# Patient Record
Sex: MALE | NOT HISPANIC OR LATINO | URBAN - METROPOLITAN AREA
[De-identification: names, ages, dates, MRNs, and addresses within clinical notes are randomized per-mention and may not be internally consistent; named-entity substitution may affect disease eponyms.]

---

## 2019-02-24 ENCOUNTER — EMERGENCY (EMERGENCY)
Facility: HOSPITAL | Age: 64
LOS: 0 days | Discharge: PSYCHIATRIC OR SUBSTANCE USE FACILITY | End: 2019-02-25
Attending: EMERGENCY MEDICINE
Payer: MEDICAID

## 2019-02-24 VITALS
OXYGEN SATURATION: 100 % | WEIGHT: 169.98 LBS | SYSTOLIC BLOOD PRESSURE: 187 MMHG | HEART RATE: 72 BPM | HEIGHT: 72 IN | DIASTOLIC BLOOD PRESSURE: 96 MMHG | TEMPERATURE: 98 F

## 2019-02-24 LAB
ALBUMIN SERPL ELPH-MCNC: 3.3 G/DL — SIGNIFICANT CHANGE UP (ref 3.3–5)
ALP SERPL-CCNC: 67 U/L — SIGNIFICANT CHANGE UP (ref 40–120)
ALT FLD-CCNC: 35 U/L — SIGNIFICANT CHANGE UP (ref 12–78)
ANION GAP SERPL CALC-SCNC: 6 MMOL/L — SIGNIFICANT CHANGE UP (ref 5–17)
AST SERPL-CCNC: 41 U/L — HIGH (ref 15–37)
BASOPHILS # BLD AUTO: 0.03 K/UL — SIGNIFICANT CHANGE UP (ref 0–0.2)
BASOPHILS NFR BLD AUTO: 0.5 % — SIGNIFICANT CHANGE UP (ref 0–2)
BILIRUB SERPL-MCNC: 0.4 MG/DL — SIGNIFICANT CHANGE UP (ref 0.2–1.2)
BUN SERPL-MCNC: 16 MG/DL — SIGNIFICANT CHANGE UP (ref 7–23)
CALCIUM SERPL-MCNC: 8.3 MG/DL — LOW (ref 8.5–10.1)
CHLORIDE SERPL-SCNC: 109 MMOL/L — HIGH (ref 96–108)
CO2 SERPL-SCNC: 27 MMOL/L — SIGNIFICANT CHANGE UP (ref 22–31)
CREAT SERPL-MCNC: 0.7 MG/DL — SIGNIFICANT CHANGE UP (ref 0.5–1.3)
EOSINOPHIL # BLD AUTO: 0.4 K/UL — SIGNIFICANT CHANGE UP (ref 0–0.5)
EOSINOPHIL NFR BLD AUTO: 6.7 % — HIGH (ref 0–6)
ETHANOL SERPL-MCNC: <10 MG/DL — SIGNIFICANT CHANGE UP (ref 0–10)
GLUCOSE SERPL-MCNC: 95 MG/DL — SIGNIFICANT CHANGE UP (ref 70–99)
HCT VFR BLD CALC: 42.4 % — SIGNIFICANT CHANGE UP (ref 39–50)
HGB BLD-MCNC: 13.1 G/DL — SIGNIFICANT CHANGE UP (ref 13–17)
IMM GRANULOCYTES NFR BLD AUTO: 0.2 % — SIGNIFICANT CHANGE UP (ref 0–1.5)
LYMPHOCYTES # BLD AUTO: 1.46 K/UL — SIGNIFICANT CHANGE UP (ref 1–3.3)
LYMPHOCYTES # BLD AUTO: 24.3 % — SIGNIFICANT CHANGE UP (ref 13–44)
MCHC RBC-ENTMCNC: 27.1 PG — SIGNIFICANT CHANGE UP (ref 27–34)
MCHC RBC-ENTMCNC: 30.9 GM/DL — LOW (ref 32–36)
MCV RBC AUTO: 87.8 FL — SIGNIFICANT CHANGE UP (ref 80–100)
MONOCYTES # BLD AUTO: 0.65 K/UL — SIGNIFICANT CHANGE UP (ref 0–0.9)
MONOCYTES NFR BLD AUTO: 10.8 % — SIGNIFICANT CHANGE UP (ref 2–14)
NEUTROPHILS # BLD AUTO: 3.45 K/UL — SIGNIFICANT CHANGE UP (ref 1.8–7.4)
NEUTROPHILS NFR BLD AUTO: 57.5 % — SIGNIFICANT CHANGE UP (ref 43–77)
NRBC # BLD: 0 /100 WBCS — SIGNIFICANT CHANGE UP (ref 0–0)
PLATELET # BLD AUTO: 128 K/UL — LOW (ref 150–400)
POTASSIUM SERPL-MCNC: 3.5 MMOL/L — SIGNIFICANT CHANGE UP (ref 3.5–5.3)
POTASSIUM SERPL-SCNC: 3.5 MMOL/L — SIGNIFICANT CHANGE UP (ref 3.5–5.3)
PROT SERPL-MCNC: 6.9 GM/DL — SIGNIFICANT CHANGE UP (ref 6–8.3)
RBC # BLD: 4.83 M/UL — SIGNIFICANT CHANGE UP (ref 4.2–5.8)
RBC # FLD: 14.6 % — HIGH (ref 10.3–14.5)
SODIUM SERPL-SCNC: 142 MMOL/L — SIGNIFICANT CHANGE UP (ref 135–145)
WBC # BLD: 6 K/UL — SIGNIFICANT CHANGE UP (ref 3.8–10.5)
WBC # FLD AUTO: 6 K/UL — SIGNIFICANT CHANGE UP (ref 3.8–10.5)

## 2019-02-24 PROCEDURE — 99285 EMERGENCY DEPT VISIT HI MDM: CPT

## 2019-02-24 NOTE — ED ADULT TRIAGE NOTE - CHIEF COMPLAINT QUOTE
pt walked to EMS station for help. pt states he is here "because a cult is chasing him" and they come to his house and take his stuff. denies SI/HI at this time

## 2019-02-24 NOTE — ED ADULT NURSE NOTE - OBJECTIVE STATEMENT
63 y/o male pmhx htn dm presents to the ed after going to ems after stating that a cult is after him. patient is aware of the month and year but unable to recall the day, states "there is a lot that has been going on" denies hallucination, delusions

## 2019-02-24 NOTE — ED ADULT NURSE NOTE - HPI (INCLUDE ILLNESS QUALITY, SEVERITY, DURATION, TIMING, CONTEXT, MODIFYING FACTORS, ASSOCIATED SIGNS AND SYMPTOMS)
patient states "I don't feel safe at home. there are some people from a [place] cult and if you don't pass the line then you will be eliminated " I walked about ten miles today, I walked from [place] to [facility]

## 2019-02-24 NOTE — ED ADULT NURSE NOTE - NSIMPLEMENTINTERV_GEN_ALL_ED
Implemented All Universal Safety Interventions:  [place] to call system. Call bell, personal items and telephone within reach. Instruct patient to call for assistance. Room bathroom lighting operational. Non-slip footwear when patient is off stretcher. Physically safe environment: no spills, clutter or unnecessary equipment. Stretcher in lowest position, wheels locked, appropriate side rails in place.

## 2019-02-25 ENCOUNTER — INPATIENT (INPATIENT)
Facility: HOSPITAL | Age: 64
LOS: 45 days | Discharge: HOME | End: 2019-04-12
Attending: PSYCHIATRY & NEUROLOGY | Admitting: PSYCHIATRY & NEUROLOGY
Payer: MEDICAID

## 2019-02-25 VITALS
SYSTOLIC BLOOD PRESSURE: 165 MMHG | OXYGEN SATURATION: 99 % | TEMPERATURE: 98 F | DIASTOLIC BLOOD PRESSURE: 89 MMHG | HEART RATE: 61 BPM | RESPIRATION RATE: 17 BRPM

## 2019-02-25 VITALS — HEIGHT: 72 IN | TEMPERATURE: 98 F | WEIGHT: 162.92 LBS | RESPIRATION RATE: 14 BRPM

## 2019-02-25 DIAGNOSIS — R45.86 EMOTIONAL LABILITY: ICD-10-CM

## 2019-02-25 DIAGNOSIS — F29 UNSPECIFIED PSYCHOSIS NOT DUE TO A SUBSTANCE OR KNOWN PHYSIOLOGICAL CONDITION: ICD-10-CM

## 2019-02-25 DIAGNOSIS — F25.9 SCHIZOAFFECTIVE DISORDER, UNSPECIFIED: ICD-10-CM

## 2019-02-25 LAB
AMPHET UR-MCNC: NEGATIVE — SIGNIFICANT CHANGE UP
APPEARANCE UR: CLEAR — SIGNIFICANT CHANGE UP
BARBITURATES UR SCN-MCNC: NEGATIVE — SIGNIFICANT CHANGE UP
BENZODIAZ UR-MCNC: NEGATIVE — SIGNIFICANT CHANGE UP
BILIRUB UR-MCNC: NEGATIVE — SIGNIFICANT CHANGE UP
COCAINE METAB.OTHER UR-MCNC: NEGATIVE — SIGNIFICANT CHANGE UP
COLOR SPEC: YELLOW — SIGNIFICANT CHANGE UP
DIFF PNL FLD: NEGATIVE — SIGNIFICANT CHANGE UP
GLUCOSE BLDC GLUCOMTR-MCNC: 110 MG/DL — HIGH (ref 70–99)
GLUCOSE BLDC GLUCOMTR-MCNC: 167 MG/DL — HIGH (ref 70–99)
GLUCOSE UR QL: NEGATIVE — SIGNIFICANT CHANGE UP
KETONES UR-MCNC: NEGATIVE — SIGNIFICANT CHANGE UP
LEUKOCYTE ESTERASE UR-ACNC: NEGATIVE — SIGNIFICANT CHANGE UP
METHADONE UR-MCNC: NEGATIVE — SIGNIFICANT CHANGE UP
NITRITE UR-MCNC: NEGATIVE — SIGNIFICANT CHANGE UP
OPIATES UR-MCNC: NEGATIVE — SIGNIFICANT CHANGE UP
PCP SPEC-MCNC: SIGNIFICANT CHANGE UP
PCP UR-MCNC: NEGATIVE — SIGNIFICANT CHANGE UP
PH UR: 6 — SIGNIFICANT CHANGE UP (ref 5–8)
PROT UR-MCNC: NEGATIVE — SIGNIFICANT CHANGE UP
SP GR SPEC: 1.02 — SIGNIFICANT CHANGE UP (ref 1.01–1.02)
THC UR QL: NEGATIVE — SIGNIFICANT CHANGE UP
UROBILINOGEN FLD QL: NEGATIVE — SIGNIFICANT CHANGE UP

## 2019-02-25 PROCEDURE — 99223 1ST HOSP IP/OBS HIGH 75: CPT

## 2019-02-25 PROCEDURE — 70450 CT HEAD/BRAIN W/O DYE: CPT | Mod: 26

## 2019-02-25 RX ORDER — TRAZODONE HCL 50 MG
25 TABLET ORAL AT BEDTIME
Qty: 0 | Refills: 0 | Status: DISCONTINUED | OUTPATIENT
Start: 2019-02-25 | End: 2019-04-12

## 2019-02-25 RX ORDER — SODIUM CHLORIDE 9 MG/ML
1000 INJECTION, SOLUTION INTRAVENOUS
Qty: 0 | Refills: 0 | Status: DISCONTINUED | OUTPATIENT
Start: 2019-02-25 | End: 2019-03-18

## 2019-02-25 RX ORDER — RISPERIDONE 4 MG/1
1 TABLET ORAL
Qty: 0 | Refills: 0 | Status: DISCONTINUED | OUTPATIENT
Start: 2019-02-25 | End: 2019-02-28

## 2019-02-25 RX ORDER — HALOPERIDOL DECANOATE 100 MG/ML
2 INJECTION INTRAMUSCULAR EVERY 6 HOURS
Qty: 0 | Refills: 0 | Status: DISCONTINUED | OUTPATIENT
Start: 2019-02-25 | End: 2019-03-04

## 2019-02-25 RX ORDER — DEXTROSE 50 % IN WATER 50 %
12.5 SYRINGE (ML) INTRAVENOUS ONCE
Qty: 0 | Refills: 0 | Status: DISCONTINUED | OUTPATIENT
Start: 2019-02-25 | End: 2019-04-10

## 2019-02-25 RX ORDER — GLUCAGON INJECTION, SOLUTION 0.5 MG/.1ML
1 INJECTION, SOLUTION SUBCUTANEOUS ONCE
Qty: 0 | Refills: 0 | Status: DISCONTINUED | OUTPATIENT
Start: 2019-02-25 | End: 2019-04-10

## 2019-02-25 RX ORDER — MIDAZOLAM HYDROCHLORIDE 1 MG/ML
1 INJECTION, SOLUTION INTRAMUSCULAR; INTRAVENOUS ONCE
Qty: 0 | Refills: 0 | Status: DISCONTINUED | OUTPATIENT
Start: 2019-02-25 | End: 2019-02-25

## 2019-02-25 RX ORDER — METFORMIN HYDROCHLORIDE 850 MG/1
500 TABLET ORAL
Qty: 0 | Refills: 0 | Status: DISCONTINUED | OUTPATIENT
Start: 2019-02-25 | End: 2019-04-10

## 2019-02-25 RX ORDER — AMLODIPINE BESYLATE 2.5 MG/1
5 TABLET ORAL DAILY
Qty: 0 | Refills: 0 | Status: DISCONTINUED | OUTPATIENT
Start: 2019-02-25 | End: 2019-04-12

## 2019-02-25 RX ORDER — RISPERIDONE 4 MG/1
0.5 TABLET ORAL
Qty: 0 | Refills: 0 | Status: DISCONTINUED | OUTPATIENT
Start: 2019-02-25 | End: 2019-02-25

## 2019-02-25 RX ORDER — INSULIN LISPRO 100/ML
VIAL (ML) SUBCUTANEOUS
Qty: 0 | Refills: 0 | Status: DISCONTINUED | OUTPATIENT
Start: 2019-02-25 | End: 2019-03-18

## 2019-02-25 RX ORDER — HALOPERIDOL DECANOATE 100 MG/ML
2 INJECTION INTRAMUSCULAR ONCE
Qty: 0 | Refills: 0 | Status: DISCONTINUED | OUTPATIENT
Start: 2019-02-25 | End: 2019-03-04

## 2019-02-25 RX ORDER — DEXTROSE 50 % IN WATER 50 %
15 SYRINGE (ML) INTRAVENOUS ONCE
Qty: 0 | Refills: 0 | Status: DISCONTINUED | OUTPATIENT
Start: 2019-02-25 | End: 2019-04-10

## 2019-02-25 RX ORDER — AMLODIPINE BESYLATE 2.5 MG/1
5 TABLET ORAL ONCE
Qty: 0 | Refills: 0 | Status: COMPLETED | OUTPATIENT
Start: 2019-02-25 | End: 2019-02-25

## 2019-02-25 RX ORDER — DEXTROSE 50 % IN WATER 50 %
25 SYRINGE (ML) INTRAVENOUS ONCE
Qty: 0 | Refills: 0 | Status: DISCONTINUED | OUTPATIENT
Start: 2019-02-25 | End: 2019-04-10

## 2019-02-25 RX ADMIN — Medication 25 MILLIGRAM(S): at 20:39

## 2019-02-25 RX ADMIN — AMLODIPINE BESYLATE 5 MILLIGRAM(S): 2.5 TABLET ORAL at 05:36

## 2019-02-25 RX ADMIN — METFORMIN HYDROCHLORIDE 500 MILLIGRAM(S): 850 TABLET ORAL at 20:37

## 2019-02-25 RX ADMIN — RISPERIDONE 1 MILLIGRAM(S): 4 TABLET ORAL at 20:39

## 2019-02-25 RX ADMIN — Medication 1: at 18:08

## 2019-02-25 RX ADMIN — AMLODIPINE BESYLATE 5 MILLIGRAM(S): 2.5 TABLET ORAL at 20:37

## 2019-02-25 RX ADMIN — MIDAZOLAM HYDROCHLORIDE 1 MILLIGRAM(S): 1 INJECTION, SOLUTION INTRAMUSCULAR; INTRAVENOUS at 05:00

## 2019-02-25 NOTE — ED BEHAVIORAL HEALTH ASSESSMENT NOTE - MEDICAL ISSUES AND PLAN (INCLUDE STANDING AND PRN MEDICATION)
per EM provider per EM provider, pt received amlodipine 5mg once for HTN. Would consider internal medicine consult for further management.

## 2019-02-25 NOTE — ED BEHAVIORAL HEALTH ASSESSMENT NOTE - DESCRIPTION
ED Course:  Patient arrived to the ED approximately hours prior to consultation. Per ED RN and documentation patient arrived alert and oriented x3 but unable to recall the day of the month, he has fair grooming and hygiene, he is cooperative with ED medical and safety protocols. Patient reports anxious mood and his affect is congruent, he denies suicidal or homicidal thoughts, denies hallucinations, is paranoid that a cult is following him stating “I don’t feel safe at home, there are some people from a [employer] cult and if you don’t pass the line then you will be eliminated”, reported walking 10 miles earlier from [place] to [place]. Patient did not have visitors to bedside. Patient remained in good behavioral control while in the ED, he did not require PRN psychiatric medication prior to assessment.    Utox and BAL negative  Head CT with no acute findings  Labs WNL    Vital Signs Last 24 Hrs  T(C): 36.9 (25 Feb 2019 04:44), Max: 36.9 (25 Feb 2019 04:44)  T(F): 98.4 (25 Feb 2019 04:44), Max: 98.4 (25 Feb 2019 04:44)  HR: 67 (25 Feb 2019 04:44) (67 - 72)  BP: 182/96 (25 Feb 2019 04:44) (182/96 - 187/96)  BP(mean): --  RR: 18 (25 Feb 2019 04:44) (18 - 18)  SpO2: 98% (25 Feb 2019 04:44) (98% - 100%) HTN and DM see HPI

## 2019-02-25 NOTE — ED BEHAVIORAL HEALTH ASSESSMENT NOTE - PSYCHIATRIC ISSUES AND PLAN (INCLUDE STANDING AND PRN MEDICATION)
Start Risperdal 0.5mg BID. Haldol 1mg/Ativan 1mg PO/IM q6h PRN severe agitation Start Risperdal 0.5mg BID. Haldol 2mg/Ativan 1mg PO/IM q6h PRN severe agitation

## 2019-02-25 NOTE — CONSULT NOTE ADULT - SUBJECTIVE AND OBJECTIVE BOX
PCP:    CHIEF COMPLAINT:   acute psychosis    HISTORY OF THE PRESENT ILLNESS:    PAST MEDICAL HISTORY:  HTN  Diabetes Mellitus    PAST SURGICAL HISTORY:    FAMILY HISTORY:   non-contributory to the patient's current presentation    SOCIAL HISTORY:  no smoking, no alcohol, no drugs    REVIEW OF SYSTEMS:   All 10 systems reviewed in detailed and found to be negative with the exception of what has already been described above    MEDICATIONS  (STANDING):  risperiDONE   Tablet 0.5 milliGRAM(s) Oral two times a day    MEDICATIONS  (PRN):  haloperidol     Tablet 2 milliGRAM(s) Oral every 6 hours PRN severe agitation  haloperidol    Injectable 2 milliGRAM(s) IntraMuscular once PRN severe agitation  LORazepam     Tablet 1 milliGRAM(s) Oral every 6 hours PRN severe agitation  LORazepam   Injectable 1 milliGRAM(s) IntraMuscular once PRN severe agitation    VITALS:  T(F): 98.1 (02-25-19 @ 11:30), Max: 98.1 (02-25-19 @ 11:30)  HR: --  BP: -- 150/70  RR: 14 (02-25-19 @ 11:30) (14 - 14)    POCT Blood Glucose.: 167 mg/dL (25 Feb 2019 17:24)  POCT Blood Glucose.: 110 mg/dL (25 Feb 2019 11:23)      PHYSICAL EXAM:  HEENT:  pupils equal and reactive, EOMI, no oropharyngeal lesions, erythema, exudates, oral thrush  NECK:   supple, no carotid bruits, no palpable lymph nodes, no thyromegaly  CV:  +S1, +S2, regular, no murmurs or rubs  RESP:   lungs clear to auscultation bilaterally, no wheezing, rales, rhonchi, good air entry bilaterally  BREAST:  not examined  GI:  abdomen soft, non-tender, non-distended, normal BS, no bruits, no abdominal masses, no palpable masses  RECTAL:  not examined  :  not examined  MSK:   normal muscle tone, no atrophy, no rigidity, no contractions  EXT:  no clubbing, no cyanosis, no edema, no calf pain, swelling or erythema  VASCULAR:  pulses equal and symmetric in the upper and lower extremities  NEURO:  AAOX3, no focal neurological deficits, follows all commands, able to move extremities spontaneously  SKIN:  no ulcers, lesions or rashes    LABS:  Labs from [facility] reviewed.    CT HEAD - negative  UA negative  Platelets 128  ETOH - negative  Urine Drug Screen - negative    EKG:  sinus farhad at 57bpm, RBBB, no acute changes, QTc 426    IMPRESSION:    ACUTE PSYCHOSIS    DIABETES MELLITUS    HYPERTENSION      RECOMMENDATIONS:  -  medically stable for admission to   -  primary management per psych team  -          d/w patient and psych RN PCP:   Patient goes to [facility]    CHIEF COMPLAINT:   acute psychosis    HISTORY OF THE PRESENT ILLNESS:  64 M with no prior psych history wandered into an EMS office near [place] looking for the police.  She was telling the EMS staff that there was a [place] cult that was after him and wanted to kill him becuase he wrote a lot of [place] songs and put the name of the father of a cult member in his song.  He claims to have written over 170 songs and now this cult is looking for him  He also claims to be hearing their voices telling him he is going to fall down.  Patient was evaluated at [facility] ED and was noted to be disorganized and suffering from paranoia.  He was not feeling safe to go home.  He denies any suicidal or homicidal thoughts or intentions.  He is unsure of what medications he takes but says that he is on Metformin for DM and 2 blood pressure meds.  He does not know what pharmacy he uses.    PAST MEDICAL HISTORY:  HTN  Diabetes Mellitus, on Metformin    PAST SURGICAL HISTORY:  NONE    FAMILY HISTORY:    Father - DM and Asthma  Mother - Fall and head trauma    SOCIAL HISTORY:  no smoking, no alcohol, no drugs, , 3 children, not working, [place]    REVIEW OF SYSTEMS:   All 10 systems reviewed in detailed and found to be negative with the exception of what has already been described above    MEDICATIONS  (STANDING):  risperiDONE   Tablet 0.5 milliGRAM(s) Oral two times a day    MEDICATIONS  (PRN):  haloperidol     Tablet 2 milliGRAM(s) Oral every 6 hours PRN severe agitation  haloperidol    Injectable 2 milliGRAM(s) IntraMuscular once PRN severe agitation  LORazepam     Tablet 1 milliGRAM(s) Oral every 6 hours PRN severe agitation  LORazepam   Injectable 1 milliGRAM(s) IntraMuscular once PRN severe agitation    VITALS:  T(F): 98.1 (02-25-19 @ 11:30), Max: 98.1 (02-25-19 @ 11:30)  HR: --  BP: -- 150/70  RR: 14 (02-25-19 @ 11:30) (14 - 14)    POCT Blood Glucose.: 167 mg/dL (25 Feb 2019 17:24)  POCT Blood Glucose.: 110 mg/dL (25 Feb 2019 11:23)      PHYSICAL EXAM:  HEENT:  pupils equal and reactive, EOMI, no oropharyngeal lesions, erythema, exudates, oral thrush  NECK:   supple, no carotid bruits, no palpable lymph nodes, no thyromegaly  CV:  +S1, +S2, regular, no murmurs or rubs  RESP:   lungs clear to auscultation bilaterally, no wheezing, rales, rhonchi, good air entry bilaterally  BREAST:  not examined  GI:  abdomen soft, non-tender, non-distended, normal BS, no bruits, no abdominal masses, no palpable masses  RECTAL:  not examined  :  not examined  MSK:   normal muscle tone, no atrophy, no rigidity, no contractions  EXT:  no clubbing, no cyanosis, no edema, no calf pain, swelling or erythema  VASCULAR:  pulses equal and symmetric in the upper and lower extremities  NEURO:  AAOX3, no focal neurological deficits, follows all commands, able to move extremities spontaneously  SKIN:  no ulcers, lesions or rashes    LABS:  Labs from [facility] reviewed.    CT HEAD - negative  UA negative  Platelets 128  ETOH - negative  Urine Drug Screen - negative    EKG:  sinus farhad at 57bpm, RBBB, no acute changes, QTc 426    IMPRESSION:    ACUTE PSYCHOSIS    DIABETES MELLITUS, ON METFORMIN    HYPERTENSION      RECOMMENDATIONS:  -  medically stable for admission to   -  primary management per psych team  -  continue Metformin  -  ADA, low salt diet, Humalog SSI   -  check HgA1c  -  for now, start Norvasc (? was on it at [facility])  -  please get in touch with his family and find out what meds and doses he is taking and order the meds for him\  -  have patient follow up with his physicians after discharge  -  will sign off    d/w patient and psych RN

## 2019-02-25 NOTE — ED ADULT NURSE REASSESSMENT NOTE - NS ED NURSE REASSESS COMMENT FT1
pt received from [facility] , pt asleep, arousable , talking , pt with ideation of cult coming to harm him, pt is not an elopement risk afraid to leave , pt not agitated . Gave report for transfer to [facility], at transfer team , wait approx 90- minutes until . pt Undressed in hospital gown, breakfast given , remains on enhanced observation

## 2019-02-25 NOTE — PROGRESS NOTE BEHAVIORAL HEALTH - SUMMARY
64 yr old undomiciled  male , disheveled ,preoccupied , responding to internal stimuli most likely auditory hallucination as the pt is mumbling to self. Pt with impaired ability to care for self Pt denies any suicidals ideation or plan. Pt is hospitalized in attempt to stabilize mood and affect to improve level of daily functioning.

## 2019-02-25 NOTE — ED BEHAVIORAL HEALTH ASSESSMENT NOTE - RISK ASSESSMENT
Risk factors include psychosis, not caring for self, not in psych treatment, and unable to engage in safety planning. At this time pt is at high imminent risk of harm and requires inpt hospitalization for safety and stabilization.

## 2019-02-25 NOTE — PROGRESS NOTE BEHAVIORAL HEALTH - NSBHFUPINTERVALHXFT_PSY_A_CORE
Pt is a transfer from [facility] with acute psychosis . Pt with auditory hallucination, paranoid preoccupation Pt is a poor historian.  Pt responding to internal stimuli.  He denies any suicidal ideation or plan .

## 2019-02-25 NOTE — ED BEHAVIORAL HEALTH ASSESSMENT NOTE - HPI (INCLUDE ILLNESS QUALITY, SEVERITY, DURATION, TIMING, CONTEXT, MODIFYING FACTORS, ASSOCIATED SIGNS AND SYMPTOMS)
Patient is a 63 y/o AA M, , has 3 adult children, unemployed, domiciled alone for one week (previously living with wife), with unknown PPhx, denies hx of inpt hospitalizations, denies suicide attempts, denies violence hx, denies legal problems, denies substance use, and PMhx of HTN and DM. Patient presents today brought in by EMS after he was found on the streets reporting that a cult is after him.    Per EMS report: "Pt thought that was the police station so that he can let PD know that he is being chased by a [place] cult that took his toothbrush and toothpaste and that he needs them to brush his teeth. Patient also states that they are singing [employer] and [employer]pt[employer] to him and fears that they are coming back to take more stuff from him. He states that they took all his ID and important papers. AAOx3".    On evaluation pt is calm and cooperative. He states that he is here in the ED "for my high blood pressure and diabetes". He states that he called police himself. States that "things are not good at home", reports that he may have to give up his house due to "people haunting and targeting me". States that the  of [facility] is after him, "they want me to team up with them to do crooked stuff". Patient repeatedly asks if writer can hear helicopters overhead, states "they're here for me". States that they have been stealing his money and phone, states he can hear them "on the radios and through the air conditioners". States he sent his wife "away" (to her daughter's home) due to concern for her safety. Denies thought insertion/broadcasting/withdrawal. Denies VH. Denies CAH. Denies depressed mood, pervasive anxiety symptoms, changes in sleep (6-7 hrs per night), appetite, or energy level. Denies SI/HI/I/P, urges for SIB, or aggressive I/I/P. Denies substance use. Patient is unable to provide any contact information for collateral sources, states he does not know any phone numbers by memory and that his phone has been stolen.

## 2019-02-25 NOTE — ED BEHAVIORAL HEALTH ASSESSMENT NOTE - AXIS IV
Problem related to social environment/Problems with primary support/Housing problems/Occupational problems/Problems with access to healthcare services

## 2019-02-25 NOTE — PROGRESS NOTE BEHAVIORAL HEALTH - NSBHCHARTREVIEWVS_PSY_A_CORE FT
Vital Signs Last 24 Hrs  T(C): 36.7 (25 Feb 2019 11:30), Max: 36.7 (25 Feb 2019 11:30)  T(F): 98.1 (25 Feb 2019 11:30), Max: 98.1 (25 Feb 2019 11:30)  HR: --  BP: --  BP(mean): --  RR: 14 (25 Feb 2019 11:30) (14 - 14)  SpO2: --

## 2019-02-25 NOTE — ED PROVIDER NOTE - OBJECTIVE STATEMENT
Pertinent PMH/PSH/FHx/SHx and Review of Systems contained within:  63 yo m with unknown pmh bibems for paranoid delusions and disorganized throughts. unable to get more history from patient.

## 2019-02-25 NOTE — PROGRESS NOTE BEHAVIORAL HEALTH - NSBHFUPADDHPIFT_PSY_A_CORE
1. disheveled, paranoid delusions; obsessive thinking, illogical; preoccupied, auditory hallucinations.  He denies any suicidal ideation or intent or plan

## 2019-02-25 NOTE — ED BEHAVIORAL HEALTH ASSESSMENT NOTE - SUMMARY
Patient is a 65 y/o AA M, , has 3 adult children, unemployed, domiciled alone for one week (previously living with wife), with unknown PPhx, denies hx of inpt hospitalizations, denies suicide attempts, denies violence hx, denies legal problems, denies substance use, and PMhx of HTN and DM. Patient presents today brought in by EMS after he was found on the streets reporting that a cult is after him. On evaluation pt reports complex paranoid delusions and associated AH that he is being targeted by a cult/gang and the  of [facility], and that he is unsafe at home and has to give up his house. He is unable to engage in safety planning or care for himself due to his psychiatric symptoms. At this time pt requires inpt hospitalization for safety and stabilization.

## 2019-02-26 DIAGNOSIS — E11.9 TYPE 2 DIABETES MELLITUS WITHOUT COMPLICATIONS: ICD-10-CM

## 2019-02-26 DIAGNOSIS — I10 ESSENTIAL (PRIMARY) HYPERTENSION: ICD-10-CM

## 2019-02-26 DIAGNOSIS — F29 UNSPECIFIED PSYCHOSIS NOT DUE TO A SUBSTANCE OR KNOWN PHYSIOLOGICAL CONDITION: ICD-10-CM

## 2019-02-26 DIAGNOSIS — F22 DELUSIONAL DISORDERS: ICD-10-CM

## 2019-02-26 LAB
GLUCOSE BLDC GLUCOMTR-MCNC: 124 MG/DL — HIGH (ref 70–99)
GLUCOSE BLDC GLUCOMTR-MCNC: 146 MG/DL — HIGH (ref 70–99)
GLUCOSE BLDC GLUCOMTR-MCNC: 179 MG/DL — HIGH (ref 70–99)
HBA1C BLD-MCNC: 7.6 % — HIGH (ref 4–5.6)

## 2019-02-26 PROCEDURE — 99231 SBSQ HOSP IP/OBS SF/LOW 25: CPT

## 2019-02-26 RX ADMIN — Medication 25 MILLIGRAM(S): at 21:39

## 2019-02-26 RX ADMIN — RISPERIDONE 1 MILLIGRAM(S): 4 TABLET ORAL at 09:07

## 2019-02-26 RX ADMIN — METFORMIN HYDROCHLORIDE 500 MILLIGRAM(S): 850 TABLET ORAL at 09:07

## 2019-02-26 RX ADMIN — METFORMIN HYDROCHLORIDE 500 MILLIGRAM(S): 850 TABLET ORAL at 21:39

## 2019-02-26 RX ADMIN — RISPERIDONE 1 MILLIGRAM(S): 4 TABLET ORAL at 21:39

## 2019-02-26 RX ADMIN — Medication 1: at 12:13

## 2019-02-26 RX ADMIN — AMLODIPINE BESYLATE 5 MILLIGRAM(S): 2.5 TABLET ORAL at 09:07

## 2019-02-26 NOTE — PROGRESS NOTE BEHAVIORAL HEALTH - NSBHCHARTREVIEWVS_PSY_A_CORE FT
Vital Signs Last 24 Hrs  T(C): 37 (26 Feb 2019 08:46), Max: 37 (26 Feb 2019 08:46)  T(F): 98.6 (26 Feb 2019 08:46), Max: 98.6 (26 Feb 2019 08:46)  HR: 62 (25 Feb 2019 20:26) (62 - 62)  BP: 172/80 (25 Feb 2019 20:26) (172/80 - 172/80)  BP(mean): --  RR: 14 (26 Feb 2019 08:46) (14 - 16)  SpO2: 100% (26 Feb 2019 08:46) (100% - 100%)

## 2019-02-27 PROBLEM — I10 ESSENTIAL (PRIMARY) HYPERTENSION: Chronic | Status: ACTIVE | Noted: 2019-02-25

## 2019-02-27 PROBLEM — E11.9 TYPE 2 DIABETES MELLITUS WITHOUT COMPLICATIONS: Chronic | Status: ACTIVE | Noted: 2019-02-25

## 2019-02-27 LAB
GLUCOSE BLDC GLUCOMTR-MCNC: 129 MG/DL — HIGH (ref 70–99)
GLUCOSE BLDC GLUCOMTR-MCNC: 143 MG/DL — HIGH (ref 70–99)
GLUCOSE BLDC GLUCOMTR-MCNC: 149 MG/DL — HIGH (ref 70–99)
GLUCOSE BLDC GLUCOMTR-MCNC: 244 MG/DL — HIGH (ref 70–99)

## 2019-02-27 PROCEDURE — 99231 SBSQ HOSP IP/OBS SF/LOW 25: CPT

## 2019-02-27 RX ADMIN — RISPERIDONE 1 MILLIGRAM(S): 4 TABLET ORAL at 09:48

## 2019-02-27 RX ADMIN — AMLODIPINE BESYLATE 5 MILLIGRAM(S): 2.5 TABLET ORAL at 09:48

## 2019-02-27 RX ADMIN — METFORMIN HYDROCHLORIDE 500 MILLIGRAM(S): 850 TABLET ORAL at 21:21

## 2019-02-27 RX ADMIN — RISPERIDONE 1 MILLIGRAM(S): 4 TABLET ORAL at 21:23

## 2019-02-27 RX ADMIN — Medication 2: at 17:26

## 2019-02-27 RX ADMIN — METFORMIN HYDROCHLORIDE 500 MILLIGRAM(S): 850 TABLET ORAL at 09:48

## 2019-02-28 DIAGNOSIS — I10 ESSENTIAL (PRIMARY) HYPERTENSION: ICD-10-CM

## 2019-02-28 DIAGNOSIS — E11.9 TYPE 2 DIABETES MELLITUS WITHOUT COMPLICATIONS: ICD-10-CM

## 2019-02-28 LAB
GLUCOSE BLDC GLUCOMTR-MCNC: 128 MG/DL — HIGH (ref 70–99)
GLUCOSE BLDC GLUCOMTR-MCNC: 152 MG/DL — HIGH (ref 70–99)
GLUCOSE BLDC GLUCOMTR-MCNC: 176 MG/DL — HIGH (ref 70–99)

## 2019-02-28 PROCEDURE — 99231 SBSQ HOSP IP/OBS SF/LOW 25: CPT

## 2019-02-28 RX ORDER — RISPERIDONE 4 MG/1
2 TABLET ORAL
Qty: 0 | Refills: 0 | Status: DISCONTINUED | OUTPATIENT
Start: 2019-02-28 | End: 2019-03-03

## 2019-02-28 RX ADMIN — Medication 1: at 12:35

## 2019-02-28 RX ADMIN — RISPERIDONE 2 MILLIGRAM(S): 4 TABLET ORAL at 21:39

## 2019-02-28 RX ADMIN — Medication 1: at 17:45

## 2019-02-28 RX ADMIN — METFORMIN HYDROCHLORIDE 500 MILLIGRAM(S): 850 TABLET ORAL at 21:39

## 2019-02-28 RX ADMIN — AMLODIPINE BESYLATE 5 MILLIGRAM(S): 2.5 TABLET ORAL at 09:48

## 2019-02-28 RX ADMIN — RISPERIDONE 1 MILLIGRAM(S): 4 TABLET ORAL at 09:48

## 2019-02-28 RX ADMIN — Medication 25 MILLIGRAM(S): at 21:42

## 2019-02-28 RX ADMIN — METFORMIN HYDROCHLORIDE 500 MILLIGRAM(S): 850 TABLET ORAL at 09:48

## 2019-02-28 NOTE — PROGRESS NOTE BEHAVIORAL HEALTH - NSBHCHARTREVIEWVS_PSY_A_CORE FT
Vital Signs Last 24 Hrs  T(C): 36.8 (28 Feb 2019 09:00), Max: 36.8 (28 Feb 2019 09:00)  T(F): 98.3 (28 Feb 2019 09:00), Max: 98.3 (28 Feb 2019 09:00)  HR: --  BP: --  BP(mean): --  RR: 14 (28 Feb 2019 09:00) (14 - 14)  SpO2: 100% (28 Feb 2019 09:00) (100% - 100%)

## 2019-02-28 NOTE — PROGRESS NOTE BEHAVIORAL HEALTH - NSBHFUPINTERVALHXFT_PSY_A_CORE
Pt with continued paranoid thoughts that people are still pursuing him on the outside , but since he is in the hospital that he feels that he is protected from them.  Pt with need for the medication to be increased to 2mg po bid.  Pt denies any suicidal ideation or plan.

## 2019-02-28 NOTE — PROGRESS NOTE BEHAVIORAL HEALTH - SUMMARY
Patient is a 63 y/o AA M, , has 3 adult children, unemployed, domiciled alone for one week (previously living with wife), with unknown PPhx, denies hx of inpt hospitalizations, denies suicide attempts, denies violence hx, denies legal problems, denies substance use, and PMhx of HTN and DM. Patient presents today brought in by EMS after he was found on the streets reporting that a cult is after him. On evaluation pt reports complex paranoid delusions and associated AH that he is being targeted by a cult/gang and the  of [facility], and that he is unsafe at home and has to give up his house. He is unable to engage in safety planning or care for himself due to his psychiatric symptoms. At this time pt requires inpt hospitalization for safety and stabilization.

## 2019-03-01 LAB
GLUCOSE BLDC GLUCOMTR-MCNC: 128 MG/DL — HIGH (ref 70–99)
GLUCOSE BLDC GLUCOMTR-MCNC: 156 MG/DL — HIGH (ref 70–99)
GLUCOSE BLDC GLUCOMTR-MCNC: 97 MG/DL — SIGNIFICANT CHANGE UP (ref 70–99)

## 2019-03-01 PROCEDURE — 99231 SBSQ HOSP IP/OBS SF/LOW 25: CPT

## 2019-03-01 RX ADMIN — Medication 1: at 08:37

## 2019-03-01 RX ADMIN — RISPERIDONE 2 MILLIGRAM(S): 4 TABLET ORAL at 09:17

## 2019-03-01 RX ADMIN — METFORMIN HYDROCHLORIDE 500 MILLIGRAM(S): 850 TABLET ORAL at 21:06

## 2019-03-01 RX ADMIN — AMLODIPINE BESYLATE 5 MILLIGRAM(S): 2.5 TABLET ORAL at 09:17

## 2019-03-01 RX ADMIN — Medication 25 MILLIGRAM(S): at 21:06

## 2019-03-01 RX ADMIN — METFORMIN HYDROCHLORIDE 500 MILLIGRAM(S): 850 TABLET ORAL at 09:16

## 2019-03-01 RX ADMIN — RISPERIDONE 2 MILLIGRAM(S): 4 TABLET ORAL at 21:06

## 2019-03-01 NOTE — PROGRESS NOTE BEHAVIORAL HEALTH - SUMMARY
Patient is a 65 y/o AA M, , has 3 adult children, unemployed, domiciled alone for one week (previously living with wife), with unknown PPhx, denies hx of inpt hospitalizations, denies suicide attempts, denies violence hx, denies legal problems, denies substance use, and PMhx of HTN and DM. Patient presents today brought in by EMS after he was found on the streets reporting that a cult is after him. On evaluation pt reports complex paranoid delusions and associated AH that he is being targeted by a cult/gang and the  of [facility], and that he is unsafe at home and has to give up his house. He is unable to engage in safety planning or care for himself due to his psychiatric symptoms. At this time pt requires inpt hospitalization for safety and stabilization.

## 2019-03-01 NOTE — PROGRESS NOTE BEHAVIORAL HEALTH - NSBHCHARTREVIEWVS_PSY_A_CORE FT
Vital Signs Last 24 Hrs  T(C): 36.9 (01 Mar 2019 08:49), Max: 36.9 (01 Mar 2019 08:49)  T(F): 98.5 (01 Mar 2019 08:49), Max: 98.5 (01 Mar 2019 08:49)  HR: --  BP: --  BP(mean): --  RR: 14 (01 Mar 2019 08:49) (14 - 14)  SpO2: 100% (01 Mar 2019 08:49) (100% - 100%)

## 2019-03-01 NOTE — PROGRESS NOTE BEHAVIORAL HEALTH - NSBHFUPINTERVALHXFT_PSY_A_CORE
Pt with good eye contact. Alert Pt with gradual improvement of mood.  Pt with decreased guardedness , although he is still with thoughts that he would not be safe when he leaves the hospital as he is still being pursued

## 2019-03-02 LAB
GLUCOSE BLDC GLUCOMTR-MCNC: 107 MG/DL — HIGH (ref 70–99)
GLUCOSE BLDC GLUCOMTR-MCNC: 150 MG/DL — HIGH (ref 70–99)
GLUCOSE BLDC GLUCOMTR-MCNC: 178 MG/DL — HIGH (ref 70–99)

## 2019-03-02 RX ADMIN — RISPERIDONE 2 MILLIGRAM(S): 4 TABLET ORAL at 09:26

## 2019-03-02 RX ADMIN — Medication 25 MILLIGRAM(S): at 21:42

## 2019-03-02 RX ADMIN — Medication 1: at 12:11

## 2019-03-02 RX ADMIN — METFORMIN HYDROCHLORIDE 500 MILLIGRAM(S): 850 TABLET ORAL at 09:26

## 2019-03-02 RX ADMIN — AMLODIPINE BESYLATE 5 MILLIGRAM(S): 2.5 TABLET ORAL at 09:26

## 2019-03-02 RX ADMIN — RISPERIDONE 2 MILLIGRAM(S): 4 TABLET ORAL at 21:42

## 2019-03-02 RX ADMIN — METFORMIN HYDROCHLORIDE 500 MILLIGRAM(S): 850 TABLET ORAL at 21:42

## 2019-03-03 LAB
GLUCOSE BLDC GLUCOMTR-MCNC: 125 MG/DL — HIGH (ref 70–99)
GLUCOSE BLDC GLUCOMTR-MCNC: 153 MG/DL — HIGH (ref 70–99)

## 2019-03-03 PROCEDURE — 99232 SBSQ HOSP IP/OBS MODERATE 35: CPT

## 2019-03-03 RX ORDER — RISPERIDONE 4 MG/1
0.5 TABLET ORAL
Qty: 0 | Refills: 0 | Status: DISCONTINUED | OUTPATIENT
Start: 2019-03-03 | End: 2019-03-05

## 2019-03-03 RX ADMIN — METFORMIN HYDROCHLORIDE 500 MILLIGRAM(S): 850 TABLET ORAL at 09:42

## 2019-03-03 RX ADMIN — RISPERIDONE 2 MILLIGRAM(S): 4 TABLET ORAL at 09:43

## 2019-03-03 RX ADMIN — Medication 25 MILLIGRAM(S): at 21:10

## 2019-03-03 RX ADMIN — AMLODIPINE BESYLATE 5 MILLIGRAM(S): 2.5 TABLET ORAL at 09:43

## 2019-03-03 RX ADMIN — RISPERIDONE 0.5 MILLIGRAM(S): 4 TABLET ORAL at 21:10

## 2019-03-03 RX ADMIN — METFORMIN HYDROCHLORIDE 500 MILLIGRAM(S): 850 TABLET ORAL at 21:10

## 2019-03-03 NOTE — PROGRESS NOTE BEHAVIORAL HEALTH - SUMMARY
Patient is a 63 y/o AA M, , has 3 adult children, unemployed, domiciled alone for one week (previously living with wife), with unknown PPhx, denies hx of inpt hospitalizations, denies suicide attempts, denies violence hx, denies legal problems, denies substance use, and PMhx of HTN and DM. Patient presents today brought in by EMS after he was found on the streets reporting that a cult is after him. On evaluation pt reports complex paranoid delusions and associated AH that he is being targeted by a cult/gang and the  of [facility], and that he is unsafe at home and has to give up his house. He is unable to engage in safety planning or care for himself due to his psychiatric symptoms. At this time pt requires inpt hospitalization for safety and stabilization.

## 2019-03-03 NOTE — PROGRESS NOTE BEHAVIORAL HEALTH - NSBHFUPINTERVALHXFT_PSY_A_CORE
Pt with no new issues He is still with periods of still paranoid about being outside of the hospital.. Pt b denies ideation or plan   He denies any side effect from medication

## 2019-03-04 DIAGNOSIS — F22 DELUSIONAL DISORDERS: ICD-10-CM

## 2019-03-04 LAB — GLUCOSE BLDC GLUCOMTR-MCNC: 118 MG/DL — HIGH (ref 70–99)

## 2019-03-04 PROCEDURE — 99231 SBSQ HOSP IP/OBS SF/LOW 25: CPT

## 2019-03-04 RX ADMIN — RISPERIDONE 0.5 MILLIGRAM(S): 4 TABLET ORAL at 08:41

## 2019-03-04 RX ADMIN — RISPERIDONE 0.5 MILLIGRAM(S): 4 TABLET ORAL at 21:35

## 2019-03-04 RX ADMIN — METFORMIN HYDROCHLORIDE 500 MILLIGRAM(S): 850 TABLET ORAL at 21:34

## 2019-03-04 RX ADMIN — AMLODIPINE BESYLATE 5 MILLIGRAM(S): 2.5 TABLET ORAL at 08:41

## 2019-03-04 RX ADMIN — METFORMIN HYDROCHLORIDE 500 MILLIGRAM(S): 850 TABLET ORAL at 08:41

## 2019-03-04 RX ADMIN — Medication 25 MILLIGRAM(S): at 21:35

## 2019-03-04 NOTE — PROGRESS NOTE BEHAVIORAL HEALTH - NSBHFUPINTERVALHXFT_PSY_A_CORE
Pt with decreased sedation ,  Pt still believing that once he leaves the hospital that people are still looking for him.

## 2019-03-04 NOTE — PROGRESS NOTE BEHAVIORAL HEALTH - NSBHCHARTREVIEWVS_PSY_A_CORE FT
Vital Signs Last 24 Hrs  T(C): 37 (04 Mar 2019 08:59), Max: 37 (04 Mar 2019 08:59)  T(F): 98.6 (04 Mar 2019 08:59), Max: 98.6 (04 Mar 2019 08:59)  HR: --  BP: --146/85  BP(mean): --  RR: 14 (04 Mar 2019 08:59) (14 - 14)  SpO2: 100% (04 Mar 2019 08:59) (100% - 100%)

## 2019-03-04 NOTE — PROGRESS NOTE BEHAVIORAL HEALTH - SUMMARY
Patient is a 63 y/o AA M, , has 3 adult children, unemployed, domiciled alone for one week (previously living with wife), with unknown PPhx, denies hx of inpt hospitalizations, denies suicide attempts, denies violence hx, denies legal problems, denies substance use, and PMhx of HTN and DM. Patient presents today brought in by EMS after he was found on the streets reporting that a cult is after him. On evaluation pt reports complex paranoid delusions and associated AH that he is being targeted by a cult/gang and the  of [facility], and that he is unsafe at home and has to give up his house. He is unable to engage in safety planning or care for himself due to his psychiatric symptoms. At this time pt requires inpt hospitalization for safety and stabilization.

## 2019-03-05 LAB — GLUCOSE BLDC GLUCOMTR-MCNC: 179 MG/DL — HIGH (ref 70–99)

## 2019-03-05 PROCEDURE — 99231 SBSQ HOSP IP/OBS SF/LOW 25: CPT

## 2019-03-05 RX ORDER — RISPERIDONE 4 MG/1
0.5 TABLET ORAL THREE TIMES A DAY
Qty: 0 | Refills: 0 | Status: DISCONTINUED | OUTPATIENT
Start: 2019-03-05 | End: 2019-03-11

## 2019-03-05 RX ADMIN — RISPERIDONE 0.5 MILLIGRAM(S): 4 TABLET ORAL at 08:25

## 2019-03-05 RX ADMIN — Medication 25 MILLIGRAM(S): at 21:22

## 2019-03-05 RX ADMIN — METFORMIN HYDROCHLORIDE 500 MILLIGRAM(S): 850 TABLET ORAL at 08:25

## 2019-03-05 RX ADMIN — Medication 1: at 08:25

## 2019-03-05 RX ADMIN — RISPERIDONE 0.5 MILLIGRAM(S): 4 TABLET ORAL at 14:07

## 2019-03-05 RX ADMIN — METFORMIN HYDROCHLORIDE 500 MILLIGRAM(S): 850 TABLET ORAL at 21:22

## 2019-03-05 RX ADMIN — AMLODIPINE BESYLATE 5 MILLIGRAM(S): 2.5 TABLET ORAL at 08:25

## 2019-03-05 RX ADMIN — RISPERIDONE 0.5 MILLIGRAM(S): 4 TABLET ORAL at 21:22

## 2019-03-05 NOTE — PROGRESS NOTE BEHAVIORAL HEALTH - NSBHCHARTREVIEWVS_PSY_A_CORE FT
Vital Signs Last 24 Hrs  T(C): 37.1 (05 Mar 2019 08:30), Max: 37.1 (05 Mar 2019 08:30)  T(F): 98.7 (05 Mar 2019 08:30), Max: 98.7 (05 Mar 2019 08:30)  HR: --  BP: --149/72  BP(mean): --  RR: 14 (05 Mar 2019 08:30) (14 - 14)  SpO2: 100% (05 Mar 2019 08:30) (100% - 100%)

## 2019-03-05 NOTE — PROGRESS NOTE BEHAVIORAL HEALTH - SUMMARY
Patient is a 63 y/o AA M, , has 3 adult children, unemployed, domiciled alone for one week (previously living with wife), with unknown PPhx, denies hx of inpt hospitalizations, denies suicide attempts, denies violence hx, denies legal problems, denies substance use, and PMhx of HTN and DM. Patient presents today brought in by EMS after he was found on the streets reporting that a cult is after him. On evaluation pt reports complex paranoid delusions and associated AH that he is being targeted by a cult/gang and the  of [facility], and that he is unsafe at home and has to give up his house. He is unable to engage in safety planning or care for himself due to his psychiatric symptoms. At this time pt requires inpt hospitalization for safety and stabilization.

## 2019-03-05 NOTE — PROGRESS NOTE BEHAVIORAL HEALTH - NSBHFUPINTERVALHXFT_PSY_A_CORE
Pt with continued thoughts of people still looking for him.  Pt still with paranoid thoughts. Will increase the risperdal 0.5mg po tid

## 2019-03-06 LAB — GLUCOSE BLDC GLUCOMTR-MCNC: 113 MG/DL — HIGH (ref 70–99)

## 2019-03-06 PROCEDURE — 99232 SBSQ HOSP IP/OBS MODERATE 35: CPT

## 2019-03-06 RX ORDER — SIMETHICONE 80 MG/1
80 TABLET, CHEWABLE ORAL THREE TIMES A DAY
Qty: 0 | Refills: 0 | Status: DISCONTINUED | OUTPATIENT
Start: 2019-03-06 | End: 2019-03-07

## 2019-03-06 RX ADMIN — RISPERIDONE 0.5 MILLIGRAM(S): 4 TABLET ORAL at 08:55

## 2019-03-06 RX ADMIN — RISPERIDONE 0.5 MILLIGRAM(S): 4 TABLET ORAL at 21:52

## 2019-03-06 RX ADMIN — SIMETHICONE 80 MILLIGRAM(S): 80 TABLET, CHEWABLE ORAL at 21:49

## 2019-03-06 RX ADMIN — RISPERIDONE 0.5 MILLIGRAM(S): 4 TABLET ORAL at 13:35

## 2019-03-06 RX ADMIN — Medication 25 MILLIGRAM(S): at 21:49

## 2019-03-06 RX ADMIN — METFORMIN HYDROCHLORIDE 500 MILLIGRAM(S): 850 TABLET ORAL at 21:49

## 2019-03-06 RX ADMIN — AMLODIPINE BESYLATE 5 MILLIGRAM(S): 2.5 TABLET ORAL at 08:55

## 2019-03-06 RX ADMIN — METFORMIN HYDROCHLORIDE 500 MILLIGRAM(S): 850 TABLET ORAL at 08:55

## 2019-03-06 NOTE — PROGRESS NOTE BEHAVIORAL HEALTH - NSBHCHARTREVIEWVS_PSY_A_CORE FT
Vital Signs Last 24 Hrs  T(C): 36.7 (06 Mar 2019 07:49), Max: 36.7 (06 Mar 2019 07:49)  T(F): 98.1 (06 Mar 2019 07:49), Max: 98.1 (06 Mar 2019 07:49)  HR: --  BP: --  BP(mean): --  RR: 18 (06 Mar 2019 07:49) (18 - 18)  SpO2: 100% (06 Mar 2019 07:49) (100% - 100%)

## 2019-03-06 NOTE — PROGRESS NOTE BEHAVIORAL HEALTH - SUMMARY
Patient is a 65 y/o AA M, , has 3 adult children, unemployed, domiciled alone for one week (previously living with wife), with unknown PPhx, denies hx of inpt hospitalizations, denies suicide attempts, denies violence hx, denies legal problems, denies substance use, and PMhx of HTN and DM. Patient presents today brought in by EMS after he was found on the streets reporting that a cult is after him. On evaluation pt reports complex paranoid delusions and associated AH that he is being targeted by a cult/gang and the  of [facility], and that he is unsafe at home and has to give up his house. He is unable to engage in safety planning or care for himself due to his psychiatric symptoms. At this time pt requires inpt hospitalization for safety and stabilization.

## 2019-03-06 NOTE — PROGRESS NOTE BEHAVIORAL HEALTH - NSBHFUPINTERVALHXFT_PSY_A_CORE
Pt with continue of thoughts that people are still following him on the outside . But he is less afraid , but still delusional  Pt also with flatulence problem and placed on simethicone

## 2019-03-07 DIAGNOSIS — R14.3 FLATULENCE: ICD-10-CM

## 2019-03-07 LAB — GLUCOSE BLDC GLUCOMTR-MCNC: 180 MG/DL — HIGH (ref 70–99)

## 2019-03-07 PROCEDURE — 99232 SBSQ HOSP IP/OBS MODERATE 35: CPT

## 2019-03-07 RX ORDER — SIMETHICONE 80 MG/1
160 TABLET, CHEWABLE ORAL THREE TIMES A DAY
Qty: 0 | Refills: 0 | Status: DISCONTINUED | OUTPATIENT
Start: 2019-03-07 | End: 2019-04-12

## 2019-03-07 RX ORDER — SIMETHICONE 80 MG/1
125 TABLET, CHEWABLE ORAL THREE TIMES A DAY
Qty: 0 | Refills: 0 | Status: DISCONTINUED | OUTPATIENT
Start: 2019-03-07 | End: 2019-03-07

## 2019-03-07 RX ADMIN — SIMETHICONE 80 MILLIGRAM(S): 80 TABLET, CHEWABLE ORAL at 09:45

## 2019-03-07 RX ADMIN — METFORMIN HYDROCHLORIDE 500 MILLIGRAM(S): 850 TABLET ORAL at 09:45

## 2019-03-07 RX ADMIN — SIMETHICONE 80 MILLIGRAM(S): 80 TABLET, CHEWABLE ORAL at 12:48

## 2019-03-07 RX ADMIN — RISPERIDONE 0.5 MILLIGRAM(S): 4 TABLET ORAL at 12:48

## 2019-03-07 RX ADMIN — RISPERIDONE 0.5 MILLIGRAM(S): 4 TABLET ORAL at 20:43

## 2019-03-07 RX ADMIN — SIMETHICONE 160 MILLIGRAM(S): 80 TABLET, CHEWABLE ORAL at 20:43

## 2019-03-07 RX ADMIN — Medication: at 07:50

## 2019-03-07 RX ADMIN — Medication 25 MILLIGRAM(S): at 20:43

## 2019-03-07 RX ADMIN — AMLODIPINE BESYLATE 5 MILLIGRAM(S): 2.5 TABLET ORAL at 09:46

## 2019-03-07 RX ADMIN — METFORMIN HYDROCHLORIDE 500 MILLIGRAM(S): 850 TABLET ORAL at 20:43

## 2019-03-07 RX ADMIN — RISPERIDONE 0.5 MILLIGRAM(S): 4 TABLET ORAL at 09:45

## 2019-03-07 NOTE — PROGRESS NOTE BEHAVIORAL HEALTH - NSBHFUPINTERVALHXFT_PSY_A_CORE
Pt with less anxiety but still considering that the family "of the person I named in my song as still looking for me "   Pt with continue paranoid delusion. Pt with the risperdal increased to 0.5mg po tid as of 3/6.   Pt with the use of the simethicone for the gas problem. Pt with less anxiety but still considering that the family "of the person I named in my song as still looking for me "   Pt with continue paranoid delusion. Pt with the risperdal increased to 0.5mg po tid as of 3/6.   Pt with the use of the simethicone for the gas problem.  Will increase the dose to 125mg tid after every meal

## 2019-03-07 NOTE — PROGRESS NOTE BEHAVIORAL HEALTH - NSBHCHARTREVIEWVS_PSY_A_CORE FT
Vital Signs Last 24 Hrs  T(C): 36.9 (07 Mar 2019 08:42), Max: 36.9 (07 Mar 2019 08:42)  T(F): 98.4 (07 Mar 2019 08:42), Max: 98.4 (07 Mar 2019 08:42)  HR: --  BP: --  BP(mean): --  RR: 14 (07 Mar 2019 08:42) (14 - 14)  SpO2: 100% (07 Mar 2019 08:42) (100% - 100%)

## 2019-03-08 LAB
CHOLEST SERPL-MCNC: 212 MG/DL — HIGH (ref 10–199)
GLUCOSE BLDC GLUCOMTR-MCNC: 145 MG/DL — HIGH (ref 70–99)
HBA1C BLD-MCNC: 7.9 % — HIGH (ref 4–5.6)
HDLC SERPL-MCNC: 90 MG/DL — SIGNIFICANT CHANGE UP
LIPID PNL WITH DIRECT LDL SERPL: 115 MG/DL — HIGH
TOTAL CHOLESTEROL/HDL RATIO MEASUREMENT: 2.4 RATIO — LOW (ref 3.4–9.6)
TRIGL SERPL-MCNC: 36 MG/DL — SIGNIFICANT CHANGE UP (ref 10–149)
TSH SERPL-MCNC: 1.49 UU/ML — SIGNIFICANT CHANGE UP (ref 0.34–4.82)

## 2019-03-08 PROCEDURE — 99231 SBSQ HOSP IP/OBS SF/LOW 25: CPT

## 2019-03-08 RX ADMIN — Medication 25 MILLIGRAM(S): at 21:15

## 2019-03-08 RX ADMIN — SIMETHICONE 160 MILLIGRAM(S): 80 TABLET, CHEWABLE ORAL at 21:16

## 2019-03-08 RX ADMIN — SIMETHICONE 160 MILLIGRAM(S): 80 TABLET, CHEWABLE ORAL at 12:51

## 2019-03-08 RX ADMIN — RISPERIDONE 0.5 MILLIGRAM(S): 4 TABLET ORAL at 09:17

## 2019-03-08 RX ADMIN — SIMETHICONE 160 MILLIGRAM(S): 80 TABLET, CHEWABLE ORAL at 09:17

## 2019-03-08 RX ADMIN — METFORMIN HYDROCHLORIDE 500 MILLIGRAM(S): 850 TABLET ORAL at 09:16

## 2019-03-08 RX ADMIN — RISPERIDONE 0.5 MILLIGRAM(S): 4 TABLET ORAL at 12:51

## 2019-03-08 RX ADMIN — METFORMIN HYDROCHLORIDE 500 MILLIGRAM(S): 850 TABLET ORAL at 21:17

## 2019-03-08 RX ADMIN — RISPERIDONE 0.5 MILLIGRAM(S): 4 TABLET ORAL at 21:17

## 2019-03-08 RX ADMIN — AMLODIPINE BESYLATE 5 MILLIGRAM(S): 2.5 TABLET ORAL at 09:16

## 2019-03-08 NOTE — PROGRESS NOTE BEHAVIORAL HEALTH - NSBHFUPINTERVALHXFT_PSY_A_CORE
He claimed that he is no longer hearing voices.  But, pt is still with  paranoid thoughts that people are still looking for him as soon as he leaves here. .  He is still believing these individuals would attempt to kill him,

## 2019-03-08 NOTE — PROGRESS NOTE BEHAVIORAL HEALTH - NSBHADMITMEDEDUDETAILS_A_CORE FT
pt is aware of the use of medication and of the potential side effects
pt is aware of the use of medication and of the potential side effects
pt is aware of the use of medicationand of the potential for side effects
pt is aware of the use of medication and of the potential side effect

## 2019-03-08 NOTE — PROGRESS NOTE BEHAVIORAL HEALTH - SUMMARY
Patient is a 63 y/o AA M, , has 3 adult children, unemployed, domiciled alone for one week (previously living with wife), with unknown PPhx, denies hx of inpt hospitalizations, denies suicide attempts, denies violence hx, denies legal problems, denies substance use, and PMhx of HTN and DM. Patient presents today brought in by EMS after he was found on the streets reporting that a cult is after him. On evaluation pt reports complex paranoid delusions and associated AH that he is being targeted by a cult/gang and the  of [facility], and that he is unsafe at home and has to give up his house. He is unable to engage in safety planning or care for himself due to his psychiatric symptoms. At this time pt requires inpt hospitalization for safety and stabilization.

## 2019-03-08 NOTE — PROGRESS NOTE BEHAVIORAL HEALTH - NSBHCHARTREVIEWVS_PSY_A_CORE FT
Vital Signs Last 24 Hrs  T(C): 37.4 (08 Mar 2019 08:59), Max: 37.4 (08 Mar 2019 08:59)  T(F): 99.3 (08 Mar 2019 08:59), Max: 99.3 (08 Mar 2019 08:59)  HR: --  BP: --  BP(mean): --  RR: 14 (08 Mar 2019 08:59) (14 - 14)  SpO2: 96% (08 Mar 2019 08:59) (96% - 96%)

## 2019-03-09 LAB — GLUCOSE BLDC GLUCOMTR-MCNC: 164 MG/DL — HIGH (ref 70–99)

## 2019-03-09 RX ADMIN — AMLODIPINE BESYLATE 5 MILLIGRAM(S): 2.5 TABLET ORAL at 08:57

## 2019-03-09 RX ADMIN — SIMETHICONE 160 MILLIGRAM(S): 80 TABLET, CHEWABLE ORAL at 22:26

## 2019-03-09 RX ADMIN — RISPERIDONE 0.5 MILLIGRAM(S): 4 TABLET ORAL at 12:36

## 2019-03-09 RX ADMIN — SIMETHICONE 160 MILLIGRAM(S): 80 TABLET, CHEWABLE ORAL at 12:36

## 2019-03-09 RX ADMIN — Medication 25 MILLIGRAM(S): at 22:25

## 2019-03-09 RX ADMIN — METFORMIN HYDROCHLORIDE 500 MILLIGRAM(S): 850 TABLET ORAL at 22:25

## 2019-03-09 RX ADMIN — SIMETHICONE 160 MILLIGRAM(S): 80 TABLET, CHEWABLE ORAL at 08:57

## 2019-03-09 RX ADMIN — RISPERIDONE 0.5 MILLIGRAM(S): 4 TABLET ORAL at 08:57

## 2019-03-09 RX ADMIN — RISPERIDONE 0.5 MILLIGRAM(S): 4 TABLET ORAL at 22:25

## 2019-03-09 RX ADMIN — METFORMIN HYDROCHLORIDE 500 MILLIGRAM(S): 850 TABLET ORAL at 08:57

## 2019-03-10 LAB — GLUCOSE BLDC GLUCOMTR-MCNC: 121 MG/DL — HIGH (ref 70–99)

## 2019-03-10 RX ADMIN — METFORMIN HYDROCHLORIDE 500 MILLIGRAM(S): 850 TABLET ORAL at 09:17

## 2019-03-10 RX ADMIN — METFORMIN HYDROCHLORIDE 500 MILLIGRAM(S): 850 TABLET ORAL at 21:49

## 2019-03-10 RX ADMIN — SIMETHICONE 160 MILLIGRAM(S): 80 TABLET, CHEWABLE ORAL at 09:17

## 2019-03-10 RX ADMIN — RISPERIDONE 0.5 MILLIGRAM(S): 4 TABLET ORAL at 12:31

## 2019-03-10 RX ADMIN — RISPERIDONE 0.5 MILLIGRAM(S): 4 TABLET ORAL at 09:17

## 2019-03-10 RX ADMIN — RISPERIDONE 0.5 MILLIGRAM(S): 4 TABLET ORAL at 21:49

## 2019-03-10 RX ADMIN — Medication 25 MILLIGRAM(S): at 21:49

## 2019-03-10 RX ADMIN — AMLODIPINE BESYLATE 5 MILLIGRAM(S): 2.5 TABLET ORAL at 09:17

## 2019-03-10 RX ADMIN — SIMETHICONE 160 MILLIGRAM(S): 80 TABLET, CHEWABLE ORAL at 21:51

## 2019-03-10 RX ADMIN — SIMETHICONE 160 MILLIGRAM(S): 80 TABLET, CHEWABLE ORAL at 12:31

## 2019-03-11 LAB — GLUCOSE BLDC GLUCOMTR-MCNC: 133 MG/DL — HIGH (ref 70–99)

## 2019-03-11 PROCEDURE — 99231 SBSQ HOSP IP/OBS SF/LOW 25: CPT

## 2019-03-11 RX ORDER — RISPERIDONE 4 MG/1
0.5 TABLET ORAL
Qty: 0 | Refills: 0 | Status: DISCONTINUED | OUTPATIENT
Start: 2019-03-11 | End: 2019-03-25

## 2019-03-11 RX ADMIN — METFORMIN HYDROCHLORIDE 500 MILLIGRAM(S): 850 TABLET ORAL at 21:33

## 2019-03-11 RX ADMIN — RISPERIDONE 0.5 MILLIGRAM(S): 4 TABLET ORAL at 14:22

## 2019-03-11 RX ADMIN — RISPERIDONE 0.5 MILLIGRAM(S): 4 TABLET ORAL at 09:35

## 2019-03-11 RX ADMIN — RISPERIDONE 0.5 MILLIGRAM(S): 4 TABLET ORAL at 17:48

## 2019-03-11 RX ADMIN — Medication 25 MILLIGRAM(S): at 21:33

## 2019-03-11 RX ADMIN — AMLODIPINE BESYLATE 5 MILLIGRAM(S): 2.5 TABLET ORAL at 09:36

## 2019-03-11 RX ADMIN — SIMETHICONE 160 MILLIGRAM(S): 80 TABLET, CHEWABLE ORAL at 14:22

## 2019-03-11 RX ADMIN — SIMETHICONE 160 MILLIGRAM(S): 80 TABLET, CHEWABLE ORAL at 21:34

## 2019-03-11 RX ADMIN — RISPERIDONE 0.5 MILLIGRAM(S): 4 TABLET ORAL at 21:33

## 2019-03-11 RX ADMIN — METFORMIN HYDROCHLORIDE 500 MILLIGRAM(S): 850 TABLET ORAL at 09:36

## 2019-03-11 RX ADMIN — SIMETHICONE 160 MILLIGRAM(S): 80 TABLET, CHEWABLE ORAL at 09:36

## 2019-03-11 NOTE — PROGRESS NOTE BEHAVIORAL HEALTH - NSBHFUPINTERVALHXFT_PSY_A_CORE
Pt with decreasing paranoia .  Pt more hopeful about being able to live in the community with out being afraid of being followed by anyone.  Pt denies any side effects from medication and is no sedated.

## 2019-03-11 NOTE — PROGRESS NOTE BEHAVIORAL HEALTH - SUMMARY
Patient is a 65 y/o AA M, , has 3 adult children, unemployed, domiciled alone for one week (previously living with wife), with unknown PPhx, denies hx of inpt hospitalizations, denies suicide attempts, denies violence hx, denies legal problems, denies substance use, and PMhx of HTN and DM. Patient presents today brought in by EMS after he was found on the streets reporting that a cult is after him. On evaluation pt reports complex paranoid delusions and associated AH that he is being targeted by a cult/gang and the  of [facility], and that he is unsafe at home and has to give up his house. He is unable to engage in safety planning or care for himself due to his psychiatric symptoms. At this time pt requires inpt hospitalization for safety and stabilization.

## 2019-03-11 NOTE — PROGRESS NOTE BEHAVIORAL HEALTH - NSBHCHARTREVIEWVS_PSY_A_CORE FT
Vital Signs Last 24 Hrs  T(C): 36.9 (11 Mar 2019 09:08), Max: 36.9 (11 Mar 2019 09:08)  T(F): 98.5 (11 Mar 2019 09:08), Max: 98.5 (11 Mar 2019 09:08)  HR: --  BP: --  BP(mean): --  RR: 16 (11 Mar 2019 09:08) (16 - 16)  SpO2: 99% (11 Mar 2019 09:08) (99% - 99%)

## 2019-03-12 LAB — GLUCOSE BLDC GLUCOMTR-MCNC: 146 MG/DL — HIGH (ref 70–99)

## 2019-03-12 PROCEDURE — 99231 SBSQ HOSP IP/OBS SF/LOW 25: CPT

## 2019-03-12 RX ADMIN — AMLODIPINE BESYLATE 5 MILLIGRAM(S): 2.5 TABLET ORAL at 08:47

## 2019-03-12 RX ADMIN — Medication 25 MILLIGRAM(S): at 21:17

## 2019-03-12 RX ADMIN — SIMETHICONE 160 MILLIGRAM(S): 80 TABLET, CHEWABLE ORAL at 12:47

## 2019-03-12 RX ADMIN — SIMETHICONE 160 MILLIGRAM(S): 80 TABLET, CHEWABLE ORAL at 21:09

## 2019-03-12 RX ADMIN — SIMETHICONE 160 MILLIGRAM(S): 80 TABLET, CHEWABLE ORAL at 08:47

## 2019-03-12 RX ADMIN — RISPERIDONE 0.5 MILLIGRAM(S): 4 TABLET ORAL at 12:47

## 2019-03-12 RX ADMIN — RISPERIDONE 0.5 MILLIGRAM(S): 4 TABLET ORAL at 21:10

## 2019-03-12 RX ADMIN — METFORMIN HYDROCHLORIDE 500 MILLIGRAM(S): 850 TABLET ORAL at 21:09

## 2019-03-12 RX ADMIN — RISPERIDONE 0.5 MILLIGRAM(S): 4 TABLET ORAL at 17:36

## 2019-03-12 RX ADMIN — METFORMIN HYDROCHLORIDE 500 MILLIGRAM(S): 850 TABLET ORAL at 08:47

## 2019-03-12 RX ADMIN — RISPERIDONE 0.5 MILLIGRAM(S): 4 TABLET ORAL at 08:47

## 2019-03-12 NOTE — PROGRESS NOTE BEHAVIORAL HEALTH - SUMMARY
Patient is a 65 y/o AA M, , has 3 adult children, unemployed, domiciled alone for one week (previously living with wife), with unknown PPhx, denies hx of inpt hospitalizations, denies suicide attempts, denies violence hx, denies legal problems, denies substance use, and PMhx of HTN and DM. Patient presents today brought in by EMS after he was found on the streets reporting that a cult is after him. On evaluation pt reports complex paranoid delusions and associated AH that he is being targeted by a cult/gang and the  of [facility], and that he is unsafe at home and has to give up his house. He is unable to engage in safety planning or care for himself due to his psychiatric symptoms. At this time pt requires inpt hospitalization for safety and stabilization.

## 2019-03-12 NOTE — PROGRESS NOTE BEHAVIORAL HEALTH - NSBHCHARTREVIEWVS_PSY_A_CORE FT
Vital Signs Last 24 Hrs  T(C): 37.3 (12 Mar 2019 09:14), Max: 37.3 (12 Mar 2019 09:14)  T(F): 99.1 (12 Mar 2019 09:14), Max: 99.1 (12 Mar 2019 09:14)  HR: --  BP: --  BP(mean): --154/77  RR: 14 (12 Mar 2019 09:14) (14 - 14)  SpO2: 99% (12 Mar 2019 09:14) (99% - 99%)

## 2019-03-12 NOTE — PROGRESS NOTE BEHAVIORAL HEALTH - NSBHFUPINTERVALHXFT_PSY_A_CORE
Pt is calm and not in distress.  He denies any side effects from medication. Pt with denial of any hallucinations. Pt with decreased paranoid delusions. Pt indicating that he is wanting to "resume my life outside I will not need to spend my time worrying about what other people do or not.' Pt not with  delusions of being pursued by other people."

## 2019-03-13 LAB — GLUCOSE BLDC GLUCOMTR-MCNC: 120 MG/DL — HIGH (ref 70–99)

## 2019-03-13 PROCEDURE — 99231 SBSQ HOSP IP/OBS SF/LOW 25: CPT

## 2019-03-13 RX ADMIN — RISPERIDONE 0.5 MILLIGRAM(S): 4 TABLET ORAL at 18:35

## 2019-03-13 RX ADMIN — METFORMIN HYDROCHLORIDE 500 MILLIGRAM(S): 850 TABLET ORAL at 10:38

## 2019-03-13 RX ADMIN — Medication 25 MILLIGRAM(S): at 21:13

## 2019-03-13 RX ADMIN — SIMETHICONE 160 MILLIGRAM(S): 80 TABLET, CHEWABLE ORAL at 21:13

## 2019-03-13 RX ADMIN — RISPERIDONE 0.5 MILLIGRAM(S): 4 TABLET ORAL at 10:37

## 2019-03-13 RX ADMIN — SIMETHICONE 160 MILLIGRAM(S): 80 TABLET, CHEWABLE ORAL at 12:18

## 2019-03-13 RX ADMIN — METFORMIN HYDROCHLORIDE 500 MILLIGRAM(S): 850 TABLET ORAL at 21:13

## 2019-03-13 RX ADMIN — RISPERIDONE 0.5 MILLIGRAM(S): 4 TABLET ORAL at 12:18

## 2019-03-13 RX ADMIN — RISPERIDONE 0.5 MILLIGRAM(S): 4 TABLET ORAL at 21:13

## 2019-03-13 RX ADMIN — SIMETHICONE 160 MILLIGRAM(S): 80 TABLET, CHEWABLE ORAL at 10:36

## 2019-03-13 RX ADMIN — AMLODIPINE BESYLATE 5 MILLIGRAM(S): 2.5 TABLET ORAL at 10:37

## 2019-03-13 NOTE — PROGRESS NOTE BEHAVIORAL HEALTH - SUMMARY
Patient is a 63 y/o AA M, , has 3 adult children, unemployed, domiciled alone for one week (previously living with wife), with unknown PPhx, denies hx of inpt hospitalizations, denies suicide attempts, denies violence hx, denies legal problems, denies substance use, and PMhx of HTN and DM. Patient presents today brought in by EMS after he was found on the streets reporting that a cult is after him. On evaluation pt reports complex paranoid delusions and associated AH that he is being targeted by a cult/gang and the  of [facility], and that he is unsafe at home and has to give up his house. He is unable to engage in safety planning or care for himself due to his psychiatric symptoms. At this time pt requires inpt hospitalization for safety and stabilization.

## 2019-03-14 LAB — GLUCOSE BLDC GLUCOMTR-MCNC: 136 MG/DL — HIGH (ref 70–99)

## 2019-03-14 PROCEDURE — 99231 SBSQ HOSP IP/OBS SF/LOW 25: CPT

## 2019-03-14 RX ADMIN — SIMETHICONE 160 MILLIGRAM(S): 80 TABLET, CHEWABLE ORAL at 08:50

## 2019-03-14 RX ADMIN — RISPERIDONE 0.5 MILLIGRAM(S): 4 TABLET ORAL at 21:12

## 2019-03-14 RX ADMIN — SIMETHICONE 160 MILLIGRAM(S): 80 TABLET, CHEWABLE ORAL at 21:12

## 2019-03-14 RX ADMIN — RISPERIDONE 0.5 MILLIGRAM(S): 4 TABLET ORAL at 13:56

## 2019-03-14 RX ADMIN — METFORMIN HYDROCHLORIDE 500 MILLIGRAM(S): 850 TABLET ORAL at 08:50

## 2019-03-14 RX ADMIN — METFORMIN HYDROCHLORIDE 500 MILLIGRAM(S): 850 TABLET ORAL at 21:12

## 2019-03-14 RX ADMIN — RISPERIDONE 0.5 MILLIGRAM(S): 4 TABLET ORAL at 18:01

## 2019-03-14 RX ADMIN — SIMETHICONE 160 MILLIGRAM(S): 80 TABLET, CHEWABLE ORAL at 13:56

## 2019-03-14 RX ADMIN — RISPERIDONE 0.5 MILLIGRAM(S): 4 TABLET ORAL at 08:50

## 2019-03-14 RX ADMIN — Medication 25 MILLIGRAM(S): at 21:11

## 2019-03-14 RX ADMIN — AMLODIPINE BESYLATE 5 MILLIGRAM(S): 2.5 TABLET ORAL at 08:50

## 2019-03-14 NOTE — PROGRESS NOTE BEHAVIORAL HEALTH - NSBHFUPINTERVALHXFT_PSY_A_CORE
Pt continue with medication Pt denies any suicidal ideation or plan Pt with no side effects from medication an is more calm and is interactive with peers.   Pt denies anysleep problems

## 2019-03-14 NOTE — PROGRESS NOTE BEHAVIORAL HEALTH - SUMMARY
Patient is a 65 y/o AA M, , has 3 adult children, unemployed, domiciled alone for one week (previously living with wife), with unknown PPhx, denies hx of inpt hospitalizations, denies suicide attempts, denies violence hx, denies legal problems, denies substance use, and PMhx of HTN and DM. Patient presents today brought in by EMS after he was found on the streets reporting that a cult is after him. On evaluation pt reports complex paranoid delusions and associated AH that he is being targeted by a cult/gang and the  of [facility], and that he is unsafe at home and has to give up his house. He is unable to engage in safety planning or care for himself due to his psychiatric symptoms. At this time pt requires inpt hospitalization for safety and stabilization.

## 2019-03-15 LAB — GLUCOSE BLDC GLUCOMTR-MCNC: 154 MG/DL — HIGH (ref 70–99)

## 2019-03-15 PROCEDURE — 99231 SBSQ HOSP IP/OBS SF/LOW 25: CPT

## 2019-03-15 RX ADMIN — RISPERIDONE 0.5 MILLIGRAM(S): 4 TABLET ORAL at 09:56

## 2019-03-15 RX ADMIN — SIMETHICONE 160 MILLIGRAM(S): 80 TABLET, CHEWABLE ORAL at 09:56

## 2019-03-15 RX ADMIN — RISPERIDONE 0.5 MILLIGRAM(S): 4 TABLET ORAL at 13:37

## 2019-03-15 RX ADMIN — SIMETHICONE 160 MILLIGRAM(S): 80 TABLET, CHEWABLE ORAL at 13:37

## 2019-03-15 RX ADMIN — RISPERIDONE 0.5 MILLIGRAM(S): 4 TABLET ORAL at 21:48

## 2019-03-15 RX ADMIN — METFORMIN HYDROCHLORIDE 500 MILLIGRAM(S): 850 TABLET ORAL at 09:56

## 2019-03-15 RX ADMIN — SIMETHICONE 160 MILLIGRAM(S): 80 TABLET, CHEWABLE ORAL at 21:45

## 2019-03-15 RX ADMIN — METFORMIN HYDROCHLORIDE 500 MILLIGRAM(S): 850 TABLET ORAL at 21:46

## 2019-03-15 RX ADMIN — AMLODIPINE BESYLATE 5 MILLIGRAM(S): 2.5 TABLET ORAL at 09:56

## 2019-03-15 RX ADMIN — Medication 25 MILLIGRAM(S): at 21:49

## 2019-03-15 RX ADMIN — RISPERIDONE 0.5 MILLIGRAM(S): 4 TABLET ORAL at 17:33

## 2019-03-15 NOTE — PROGRESS NOTE BEHAVIORAL HEALTH - NSBHFUPINTERVALHXFT_PSY_A_CORE
Pt with gradual improvement of thought process.  Pt with less anxiety about the prospect of returning to the community Pt with less isolation and less preoccupation.  Pt with less ruminating paranoid thoughts .

## 2019-03-15 NOTE — PROGRESS NOTE BEHAVIORAL HEALTH - SUMMARY
Patient is a 63 y/o AA M, , has 3 adult children, unemployed, domiciled alone for one week (previously living with wife), with unknown PPhx, denies hx of inpt hospitalizations, denies suicide attempts, denies violence hx, denies legal problems, denies substance use, and PMhx of HTN and DM. Patient presents today brought in by EMS after he was found on the streets reporting that a cult is after him. On evaluation pt reports complex paranoid delusions and associated AH that he is being targeted by a cult/gang and the  of [facility], and that he is unsafe at home and has to give up his house. He is unable to engage in safety planning or care for himself due to his psychiatric symptoms. At this time pt requires inpt hospitalization for safety and stabilization.

## 2019-03-15 NOTE — PROGRESS NOTE BEHAVIORAL HEALTH - NSBHCHARTREVIEWVS_PSY_A_CORE FT
Vital Signs Last 24 Hrs  T(C): 37.5 (15 Mar 2019 09:41), Max: 37.5 (15 Mar 2019 09:41)  T(F): 99.5 (15 Mar 2019 09:41), Max: 99.5 (15 Mar 2019 09:41)  HR: --  BP: --  BP(mean): --  RR: 14 (15 Mar 2019 09:41) (14 - 14)  SpO2: 98% (15 Mar 2019 09:41) (98% - 98%)

## 2019-03-16 LAB
ALBUMIN SERPL ELPH-MCNC: 3.5 G/DL — SIGNIFICANT CHANGE UP (ref 3.3–5)
ALP SERPL-CCNC: 70 U/L — SIGNIFICANT CHANGE UP (ref 40–120)
ALT FLD-CCNC: 24 U/L — SIGNIFICANT CHANGE UP (ref 12–78)
ANION GAP SERPL CALC-SCNC: 4 MMOL/L — LOW (ref 5–17)
AST SERPL-CCNC: 20 U/L — SIGNIFICANT CHANGE UP (ref 15–37)
BASOPHILS # BLD AUTO: 0 K/UL — SIGNIFICANT CHANGE UP (ref 0–0.2)
BASOPHILS NFR BLD AUTO: 0 % — SIGNIFICANT CHANGE UP (ref 0–2)
BILIRUB SERPL-MCNC: 0.3 MG/DL — SIGNIFICANT CHANGE UP (ref 0.2–1.2)
BUN SERPL-MCNC: 18 MG/DL — SIGNIFICANT CHANGE UP (ref 7–23)
CALCIUM SERPL-MCNC: 8.8 MG/DL — SIGNIFICANT CHANGE UP (ref 8.5–10.1)
CHLORIDE SERPL-SCNC: 104 MMOL/L — SIGNIFICANT CHANGE UP (ref 96–108)
CO2 SERPL-SCNC: 29 MMOL/L — SIGNIFICANT CHANGE UP (ref 22–31)
CREAT SERPL-MCNC: 0.86 MG/DL — SIGNIFICANT CHANGE UP (ref 0.5–1.3)
EOSINOPHIL # BLD AUTO: 1.46 K/UL — HIGH (ref 0–0.5)
EOSINOPHIL NFR BLD AUTO: 20 % — HIGH (ref 0–6)
GLUCOSE BLDC GLUCOMTR-MCNC: 165 MG/DL — HIGH (ref 70–99)
GLUCOSE SERPL-MCNC: 145 MG/DL — HIGH (ref 70–99)
HCT VFR BLD CALC: 41.5 % — SIGNIFICANT CHANGE UP (ref 39–50)
HGB BLD-MCNC: 13.4 G/DL — SIGNIFICANT CHANGE UP (ref 13–17)
LYMPHOCYTES # BLD AUTO: 1.54 K/UL — SIGNIFICANT CHANGE UP (ref 1–3.3)
LYMPHOCYTES # BLD AUTO: 21 % — SIGNIFICANT CHANGE UP (ref 13–44)
MANUAL SMEAR VERIFICATION: SIGNIFICANT CHANGE UP
MCHC RBC-ENTMCNC: 28.2 PG — SIGNIFICANT CHANGE UP (ref 27–34)
MCHC RBC-ENTMCNC: 32.3 GM/DL — SIGNIFICANT CHANGE UP (ref 32–36)
MCV RBC AUTO: 87.2 FL — SIGNIFICANT CHANGE UP (ref 80–100)
MONOCYTES # BLD AUTO: 0.29 K/UL — SIGNIFICANT CHANGE UP (ref 0–0.9)
MONOCYTES NFR BLD AUTO: 4 % — SIGNIFICANT CHANGE UP (ref 2–14)
NEUTROPHILS # BLD AUTO: 4.03 K/UL — SIGNIFICANT CHANGE UP (ref 1.8–7.4)
NEUTROPHILS NFR BLD AUTO: 55 % — SIGNIFICANT CHANGE UP (ref 43–77)
NRBC # BLD: 0 /100 — SIGNIFICANT CHANGE UP (ref 0–0)
NRBC # BLD: SIGNIFICANT CHANGE UP /100 WBCS (ref 0–0)
PLAT MORPH BLD: NORMAL — SIGNIFICANT CHANGE UP
PLATELET # BLD AUTO: 127 K/UL — LOW (ref 150–400)
POTASSIUM SERPL-MCNC: 4.3 MMOL/L — SIGNIFICANT CHANGE UP (ref 3.5–5.3)
POTASSIUM SERPL-SCNC: 4.3 MMOL/L — SIGNIFICANT CHANGE UP (ref 3.5–5.3)
PROT SERPL-MCNC: 7 GM/DL — SIGNIFICANT CHANGE UP (ref 6–8.3)
RBC # BLD: 4.76 M/UL — SIGNIFICANT CHANGE UP (ref 4.2–5.8)
RBC # FLD: 14.3 % — SIGNIFICANT CHANGE UP (ref 10.3–14.5)
RBC BLD AUTO: NORMAL — SIGNIFICANT CHANGE UP
SODIUM SERPL-SCNC: 137 MMOL/L — SIGNIFICANT CHANGE UP (ref 135–145)
WBC # BLD: 7.32 K/UL — SIGNIFICANT CHANGE UP (ref 3.8–10.5)
WBC # FLD AUTO: 7.32 K/UL — SIGNIFICANT CHANGE UP (ref 3.8–10.5)

## 2019-03-16 RX ADMIN — RISPERIDONE 0.5 MILLIGRAM(S): 4 TABLET ORAL at 09:24

## 2019-03-16 RX ADMIN — RISPERIDONE 0.5 MILLIGRAM(S): 4 TABLET ORAL at 17:36

## 2019-03-16 RX ADMIN — Medication 1: at 08:06

## 2019-03-16 RX ADMIN — METFORMIN HYDROCHLORIDE 500 MILLIGRAM(S): 850 TABLET ORAL at 21:11

## 2019-03-16 RX ADMIN — SIMETHICONE 160 MILLIGRAM(S): 80 TABLET, CHEWABLE ORAL at 21:11

## 2019-03-16 RX ADMIN — RISPERIDONE 0.5 MILLIGRAM(S): 4 TABLET ORAL at 21:11

## 2019-03-16 RX ADMIN — Medication 25 MILLIGRAM(S): at 21:11

## 2019-03-16 RX ADMIN — AMLODIPINE BESYLATE 5 MILLIGRAM(S): 2.5 TABLET ORAL at 09:24

## 2019-03-16 RX ADMIN — METFORMIN HYDROCHLORIDE 500 MILLIGRAM(S): 850 TABLET ORAL at 09:24

## 2019-03-16 RX ADMIN — SIMETHICONE 160 MILLIGRAM(S): 80 TABLET, CHEWABLE ORAL at 09:24

## 2019-03-16 RX ADMIN — SIMETHICONE 160 MILLIGRAM(S): 80 TABLET, CHEWABLE ORAL at 13:14

## 2019-03-16 RX ADMIN — RISPERIDONE 0.5 MILLIGRAM(S): 4 TABLET ORAL at 13:14

## 2019-03-17 LAB — GLUCOSE BLDC GLUCOMTR-MCNC: 131 MG/DL — HIGH (ref 70–99)

## 2019-03-17 RX ADMIN — METFORMIN HYDROCHLORIDE 500 MILLIGRAM(S): 850 TABLET ORAL at 20:44

## 2019-03-17 RX ADMIN — RISPERIDONE 0.5 MILLIGRAM(S): 4 TABLET ORAL at 09:05

## 2019-03-17 RX ADMIN — RISPERIDONE 0.5 MILLIGRAM(S): 4 TABLET ORAL at 17:26

## 2019-03-17 RX ADMIN — METFORMIN HYDROCHLORIDE 500 MILLIGRAM(S): 850 TABLET ORAL at 09:05

## 2019-03-17 RX ADMIN — SIMETHICONE 160 MILLIGRAM(S): 80 TABLET, CHEWABLE ORAL at 12:26

## 2019-03-17 RX ADMIN — RISPERIDONE 0.5 MILLIGRAM(S): 4 TABLET ORAL at 12:26

## 2019-03-17 RX ADMIN — SIMETHICONE 160 MILLIGRAM(S): 80 TABLET, CHEWABLE ORAL at 20:44

## 2019-03-17 RX ADMIN — RISPERIDONE 0.5 MILLIGRAM(S): 4 TABLET ORAL at 20:44

## 2019-03-17 RX ADMIN — SIMETHICONE 160 MILLIGRAM(S): 80 TABLET, CHEWABLE ORAL at 09:05

## 2019-03-17 RX ADMIN — Medication 25 MILLIGRAM(S): at 20:44

## 2019-03-17 RX ADMIN — AMLODIPINE BESYLATE 5 MILLIGRAM(S): 2.5 TABLET ORAL at 09:05

## 2019-03-18 LAB — GLUCOSE BLDC GLUCOMTR-MCNC: 144 MG/DL — HIGH (ref 70–99)

## 2019-03-18 PROCEDURE — 99231 SBSQ HOSP IP/OBS SF/LOW 25: CPT

## 2019-03-18 RX ADMIN — SIMETHICONE 160 MILLIGRAM(S): 80 TABLET, CHEWABLE ORAL at 12:04

## 2019-03-18 RX ADMIN — RISPERIDONE 0.5 MILLIGRAM(S): 4 TABLET ORAL at 21:06

## 2019-03-18 RX ADMIN — METFORMIN HYDROCHLORIDE 500 MILLIGRAM(S): 850 TABLET ORAL at 21:06

## 2019-03-18 RX ADMIN — Medication 25 MILLIGRAM(S): at 21:52

## 2019-03-18 RX ADMIN — RISPERIDONE 0.5 MILLIGRAM(S): 4 TABLET ORAL at 08:28

## 2019-03-18 RX ADMIN — METFORMIN HYDROCHLORIDE 500 MILLIGRAM(S): 850 TABLET ORAL at 08:29

## 2019-03-18 RX ADMIN — SIMETHICONE 160 MILLIGRAM(S): 80 TABLET, CHEWABLE ORAL at 08:28

## 2019-03-18 RX ADMIN — AMLODIPINE BESYLATE 5 MILLIGRAM(S): 2.5 TABLET ORAL at 08:28

## 2019-03-18 RX ADMIN — RISPERIDONE 0.5 MILLIGRAM(S): 4 TABLET ORAL at 12:04

## 2019-03-18 RX ADMIN — SIMETHICONE 160 MILLIGRAM(S): 80 TABLET, CHEWABLE ORAL at 21:06

## 2019-03-18 RX ADMIN — RISPERIDONE 0.5 MILLIGRAM(S): 4 TABLET ORAL at 17:27

## 2019-03-18 NOTE — PROGRESS NOTE BEHAVIORAL HEALTH - NSBHCHARTREVIEWVS_PSY_A_CORE FT
Vital Signs Last 24 Hrs  T(C): 36.9 (18 Mar 2019 07:38), Max: 36.9 (18 Mar 2019 07:38)  T(F): 98.5 (18 Mar 2019 07:38), Max: 98.5 (18 Mar 2019 07:38)  HR: --  BP: --  BP(mean): --  RR: 14 (18 Mar 2019 07:38) (14 - 14)  SpO2: 99% (18 Mar 2019 07:38) (99% - 99%)

## 2019-03-18 NOTE — PROGRESS NOTE BEHAVIORAL HEALTH - SUMMARY
Patient is a 63 y/o AA M, , has 3 adult children, unemployed, domiciled alone for one week (previously living with wife), with unknown PPhx, denies hx of inpt hospitalizations, denies suicide attempts, denies violence hx, denies legal problems, denies substance use, and PMhx of HTN and DM. Patient presents today brought in by EMS after he was found on the streets reporting that a cult is after him. On evaluation pt reports complex paranoid delusions and associated AH that he is being targeted by a cult/gang and the  of [facility], and that he is unsafe at home and has to give up his house. He is unable to engage in safety planning or care for himself due to his psychiatric symptoms. At this time pt requires inpt hospitalization for safety and stabilization.

## 2019-03-18 NOTE — PROGRESS NOTE BEHAVIORAL HEALTH - NSBHFUPINTERVALHXFT_PSY_A_CORE
Pt continues with thoughts of returning to his apt and "resuming my life" Pt less anxious and is not as preoccupied with paranoid delusions .  He is still wondering about the "group of people" but not to the point of being afraid to return home soon.  Pt with family support.

## 2019-03-19 LAB — GLUCOSE BLDC GLUCOMTR-MCNC: 128 MG/DL — HIGH (ref 70–99)

## 2019-03-19 PROCEDURE — 99232 SBSQ HOSP IP/OBS MODERATE 35: CPT

## 2019-03-19 RX ADMIN — AMLODIPINE BESYLATE 5 MILLIGRAM(S): 2.5 TABLET ORAL at 09:06

## 2019-03-19 RX ADMIN — RISPERIDONE 0.5 MILLIGRAM(S): 4 TABLET ORAL at 09:06

## 2019-03-19 RX ADMIN — RISPERIDONE 0.5 MILLIGRAM(S): 4 TABLET ORAL at 21:41

## 2019-03-19 RX ADMIN — SIMETHICONE 160 MILLIGRAM(S): 80 TABLET, CHEWABLE ORAL at 21:40

## 2019-03-19 RX ADMIN — SIMETHICONE 160 MILLIGRAM(S): 80 TABLET, CHEWABLE ORAL at 13:38

## 2019-03-19 RX ADMIN — METFORMIN HYDROCHLORIDE 500 MILLIGRAM(S): 850 TABLET ORAL at 09:06

## 2019-03-19 RX ADMIN — SIMETHICONE 160 MILLIGRAM(S): 80 TABLET, CHEWABLE ORAL at 09:06

## 2019-03-19 RX ADMIN — METFORMIN HYDROCHLORIDE 500 MILLIGRAM(S): 850 TABLET ORAL at 21:40

## 2019-03-19 RX ADMIN — Medication 25 MILLIGRAM(S): at 21:41

## 2019-03-19 RX ADMIN — RISPERIDONE 0.5 MILLIGRAM(S): 4 TABLET ORAL at 17:49

## 2019-03-19 RX ADMIN — RISPERIDONE 0.5 MILLIGRAM(S): 4 TABLET ORAL at 13:38

## 2019-03-19 NOTE — PROGRESS NOTE BEHAVIORAL HEALTH - NSBHFUPINTERVALHXFT_PSY_A_CORE
Getting a repeat of the cbc with diff to follow the elevated eosinophil  and decreased platelets. If it remains more abnormal will need to get consult and added blood work  Pt not as concerned about the belief that he is still being pursued

## 2019-03-19 NOTE — PROGRESS NOTE BEHAVIORAL HEALTH - NSBHCHARTREVIEWVS_PSY_A_CORE FT
Vital Signs Last 24 Hrs  T(C): 36.9 (18 Mar 2019 07:38), Max: 36.9 (18 Mar 2019 07:38)  T(F): 98.5 (18 Mar 2019 07:38), Max: 98.5 (18 Mar 2019 07:38)  HR: --  BP: --  BP(mean): --  RR: 14 (18 Mar 2019 07:38) (14 - 14)  SpO2: 99% (18 Mar 2019 07:38) (99% - 99%) /77

## 2019-03-19 NOTE — PROGRESS NOTE BEHAVIORAL HEALTH - SUMMARY
Patient is a 65 y/o AA M, , has 3 adult children, unemployed, domiciled alone for one week (previously living with wife), with unknown PPhx, denies hx of inpt hospitalizations, denies suicide attempts, denies violence hx, denies legal problems, denies substance use, and PMhx of HTN and DM. Patient presents today brought in by EMS after he was found on the streets reporting that a cult is after him. On evaluation pt reports complex paranoid delusions and associated AH that he is being targeted by a cult/gang and the  of [facility], and that he is unsafe at home and has to give up his house. He is unable to engage in safety planning or care for himself due to his psychiatric symptoms. At this time pt requires inpt hospitalization for safety and stabilization.

## 2019-03-20 LAB
BASOPHILS # BLD AUTO: 0 K/UL — SIGNIFICANT CHANGE UP (ref 0–0.2)
BASOPHILS NFR BLD AUTO: 0 % — SIGNIFICANT CHANGE UP (ref 0–2)
EOSINOPHIL # BLD AUTO: 2.08 K/UL — HIGH (ref 0–0.5)
EOSINOPHIL NFR BLD AUTO: 25 % — HIGH (ref 0–6)
GLUCOSE BLDC GLUCOMTR-MCNC: 151 MG/DL — HIGH (ref 70–99)
HCT VFR BLD CALC: 41.8 % — SIGNIFICANT CHANGE UP (ref 39–50)
HGB BLD-MCNC: 13.3 G/DL — SIGNIFICANT CHANGE UP (ref 13–17)
LYMPHOCYTES # BLD AUTO: 1.83 K/UL — SIGNIFICANT CHANGE UP (ref 1–3.3)
LYMPHOCYTES # BLD AUTO: 22 % — SIGNIFICANT CHANGE UP (ref 13–44)
MANUAL SMEAR VERIFICATION: SIGNIFICANT CHANGE UP
MCHC RBC-ENTMCNC: 28.3 PG — SIGNIFICANT CHANGE UP (ref 27–34)
MCHC RBC-ENTMCNC: 31.8 GM/DL — LOW (ref 32–36)
MCV RBC AUTO: 88.9 FL — SIGNIFICANT CHANGE UP (ref 80–100)
MONOCYTES # BLD AUTO: 0.66 K/UL — SIGNIFICANT CHANGE UP (ref 0–0.9)
MONOCYTES NFR BLD AUTO: 8 % — SIGNIFICANT CHANGE UP (ref 2–14)
NEUTROPHILS # BLD AUTO: 3.74 K/UL — SIGNIFICANT CHANGE UP (ref 1.8–7.4)
NEUTROPHILS NFR BLD AUTO: 45 % — SIGNIFICANT CHANGE UP (ref 43–77)
NRBC # BLD: 0 /100 — SIGNIFICANT CHANGE UP (ref 0–0)
NRBC # BLD: SIGNIFICANT CHANGE UP /100 WBCS (ref 0–0)
PLAT MORPH BLD: NORMAL — SIGNIFICANT CHANGE UP
PLATELET # BLD AUTO: 128 K/UL — LOW (ref 150–400)
RBC # BLD: 4.7 M/UL — SIGNIFICANT CHANGE UP (ref 4.2–5.8)
RBC # FLD: 14.4 % — SIGNIFICANT CHANGE UP (ref 10.3–14.5)
RBC BLD AUTO: NORMAL — SIGNIFICANT CHANGE UP
WBC # BLD: 8.3 K/UL — SIGNIFICANT CHANGE UP (ref 3.8–10.5)
WBC # FLD AUTO: 8.3 K/UL — SIGNIFICANT CHANGE UP (ref 3.8–10.5)

## 2019-03-20 PROCEDURE — 99232 SBSQ HOSP IP/OBS MODERATE 35: CPT

## 2019-03-20 RX ADMIN — METFORMIN HYDROCHLORIDE 500 MILLIGRAM(S): 850 TABLET ORAL at 08:33

## 2019-03-20 RX ADMIN — SIMETHICONE 160 MILLIGRAM(S): 80 TABLET, CHEWABLE ORAL at 14:32

## 2019-03-20 RX ADMIN — AMLODIPINE BESYLATE 5 MILLIGRAM(S): 2.5 TABLET ORAL at 08:33

## 2019-03-20 RX ADMIN — RISPERIDONE 0.5 MILLIGRAM(S): 4 TABLET ORAL at 08:33

## 2019-03-20 RX ADMIN — SIMETHICONE 160 MILLIGRAM(S): 80 TABLET, CHEWABLE ORAL at 20:41

## 2019-03-20 RX ADMIN — SIMETHICONE 160 MILLIGRAM(S): 80 TABLET, CHEWABLE ORAL at 08:33

## 2019-03-20 RX ADMIN — METFORMIN HYDROCHLORIDE 500 MILLIGRAM(S): 850 TABLET ORAL at 20:41

## 2019-03-20 RX ADMIN — Medication 25 MILLIGRAM(S): at 20:42

## 2019-03-20 RX ADMIN — RISPERIDONE 0.5 MILLIGRAM(S): 4 TABLET ORAL at 14:32

## 2019-03-20 RX ADMIN — RISPERIDONE 0.5 MILLIGRAM(S): 4 TABLET ORAL at 20:42

## 2019-03-20 NOTE — PROGRESS NOTE BEHAVIORAL HEALTH - NSBHFUPINTERVALHXFT_PSY_A_CORE
reviewed the labs and pt still with elevated eosinophilia and decreased platelets.  Pt with denial of any other symptoms.  This is not a chronic condition and pt willing to continue with follow up on outpatient sitting.  Pt denies any suicidal ideation or plan.

## 2019-03-20 NOTE — PROGRESS NOTE BEHAVIORAL HEALTH - SUMMARY
Patient is a 65 y/o AA M, , has 3 adult children, unemployed, domiciled alone for one week (previously living with wife), with unknown PPhx, denies hx of inpt hospitalizations, denies suicide attempts, denies violence hx, denies legal problems, denies substance use, and PMhx of HTN and DM. Patient presents today brought in by EMS after he was found on the streets reporting that a cult is after him. On evaluation pt reports complex paranoid delusions and associated AH that he is being targeted by a cult/gang and the  of [facility], and that he is unsafe at home and has to give up his house. He is unable to engage in safety planning or care for himself due to his psychiatric symptoms. At this time pt requires inpt hospitalization for safety and stabilization.

## 2019-03-21 LAB — GLUCOSE BLDC GLUCOMTR-MCNC: 134 MG/DL — HIGH (ref 70–99)

## 2019-03-21 PROCEDURE — 99231 SBSQ HOSP IP/OBS SF/LOW 25: CPT

## 2019-03-21 RX ORDER — MEMANTINE HYDROCHLORIDE 10 MG/1
5 TABLET ORAL DAILY
Qty: 0 | Refills: 0 | Status: DISCONTINUED | OUTPATIENT
Start: 2019-03-21 | End: 2019-03-25

## 2019-03-21 RX ADMIN — RISPERIDONE 0.5 MILLIGRAM(S): 4 TABLET ORAL at 16:55

## 2019-03-21 RX ADMIN — SIMETHICONE 160 MILLIGRAM(S): 80 TABLET, CHEWABLE ORAL at 08:54

## 2019-03-21 RX ADMIN — MEMANTINE HYDROCHLORIDE 5 MILLIGRAM(S): 10 TABLET ORAL at 16:55

## 2019-03-21 RX ADMIN — RISPERIDONE 0.5 MILLIGRAM(S): 4 TABLET ORAL at 22:05

## 2019-03-21 RX ADMIN — SIMETHICONE 160 MILLIGRAM(S): 80 TABLET, CHEWABLE ORAL at 22:05

## 2019-03-21 RX ADMIN — SIMETHICONE 160 MILLIGRAM(S): 80 TABLET, CHEWABLE ORAL at 13:49

## 2019-03-21 RX ADMIN — RISPERIDONE 0.5 MILLIGRAM(S): 4 TABLET ORAL at 13:49

## 2019-03-21 RX ADMIN — AMLODIPINE BESYLATE 5 MILLIGRAM(S): 2.5 TABLET ORAL at 08:54

## 2019-03-21 RX ADMIN — Medication 25 MILLIGRAM(S): at 22:05

## 2019-03-21 RX ADMIN — METFORMIN HYDROCHLORIDE 500 MILLIGRAM(S): 850 TABLET ORAL at 22:05

## 2019-03-21 RX ADMIN — RISPERIDONE 0.5 MILLIGRAM(S): 4 TABLET ORAL at 08:55

## 2019-03-21 RX ADMIN — METFORMIN HYDROCHLORIDE 500 MILLIGRAM(S): 850 TABLET ORAL at 08:54

## 2019-03-21 NOTE — CHART NOTE - NSCHARTNOTEFT_GEN_A_CORE
ALINA Called [facility] for FU on outpt. appt. Clinical packet was received but still in review to be provided appt. per Ms. Carter. ALINA advised of anticipated DC tomorrow. ALINA to call back at 12pm for appt. ALINA called back at 12pm  and  for Ms. Carter. ALINA to FU.

## 2019-03-21 NOTE — PROGRESS NOTE BEHAVIORAL HEALTH - NSBHCHARTREVIEWVS_PSY_A_CORE FT
Vital Signs Last 24 Hrs  T(C): 36.9 (21 Mar 2019 07:56), Max: 36.9 (21 Mar 2019 07:56)  T(F): 98.5 (21 Mar 2019 07:56), Max: 98.5 (21 Mar 2019 07:56)  HR: --  BP: --  BP(mean): --  RR: 14 (21 Mar 2019 07:56) (14 - 14)  SpO2: 99% (21 Mar 2019 07:56) (99% - 99%)

## 2019-03-21 NOTE — PROGRESS NOTE BEHAVIORAL HEALTH - NSBHFUPINTERVALHXFT_PSY_A_CORE
Spoke with the pt who has some forgetfulness especially as the day progresses, pt forgetting the use of the food truck and attempting to return food trays to the nursing station .  Pt now indicating that he has a family hx of early onset dementia. .

## 2019-03-21 NOTE — PROGRESS NOTE BEHAVIORAL HEALTH - SUMMARY
Patient is a 63 y/o AA M, , has 3 adult children, unemployed, domiciled alone for one week (previously living with wife), with unknown PPhx, denies hx of inpt hospitalizations, denies suicide attempts, denies violence hx, denies legal problems, denies substance use, and PMhx of HTN and DM. Patient presents today brought in by EMS after he was found on the streets reporting that a cult is after him. On evaluation pt reports complex paranoid delusions and associated AH that he is being targeted by a cult/gang and the  of [facility], and that he is unsafe at home and has to give up his house. He is unable to engage in safety planning or care for himself due to his psychiatric symptoms. At this time pt requires inpt hospitalization for safety and stabilization.

## 2019-03-21 NOTE — CHART NOTE - NSCHARTNOTEFT_GEN_A_CORE
ALINA advised by Ms. Carter of [facility] that pt. has no prior outpt. services with this facility and that pt. not able to be offered appointment at this time. ALINA called [facility] and spoke to Tomasz at admissions (ph. [phone] f.[phone]) who confirmed that sliding scale services were offered for those uninsured and that the geriatric outpt. clinic was accepting new appointments. Tomasz requested clinical info. be faxed for referral for services and also a phone number to contact pt. after DC be provided as part of referral. ALINA met with pt. who confirmed that he had no outpt. mental health care prior to admission and who provided verbal consent for SW to make referral to [facility] for aftercare appointment. Pt. seemed more concerned with his housing arrangements post-discharge during this interview; pt. may benefit from additional discussion on the benefits of following up with outpt. appts. as DC approaches. Pt. advised further he currently had no phone in this country. Pt. advised he was planning making arrangements to stay with a friend after hospital DC and he stated that he could offer this friend's phone number as a contact for the mental health referral once pt. got in touch with his friend. ALINA faxed over clinical packet to Tomasz at [facility] with request to follow up with psych ALINA Ibrahim for additional referral needs. ALINA noted in fax to [facility] that pt. currently has no phone number to provide at this time but that he is in the process of obtaining one for the referral. ALINA to .

## 2019-03-22 DIAGNOSIS — R41.3 OTHER AMNESIA: ICD-10-CM

## 2019-03-22 PROCEDURE — 99231 SBSQ HOSP IP/OBS SF/LOW 25: CPT

## 2019-03-22 RX ADMIN — MEMANTINE HYDROCHLORIDE 5 MILLIGRAM(S): 10 TABLET ORAL at 09:29

## 2019-03-22 RX ADMIN — SIMETHICONE 160 MILLIGRAM(S): 80 TABLET, CHEWABLE ORAL at 20:54

## 2019-03-22 RX ADMIN — METFORMIN HYDROCHLORIDE 500 MILLIGRAM(S): 850 TABLET ORAL at 20:54

## 2019-03-22 RX ADMIN — RISPERIDONE 0.5 MILLIGRAM(S): 4 TABLET ORAL at 20:55

## 2019-03-22 RX ADMIN — METFORMIN HYDROCHLORIDE 500 MILLIGRAM(S): 850 TABLET ORAL at 09:29

## 2019-03-22 RX ADMIN — SIMETHICONE 160 MILLIGRAM(S): 80 TABLET, CHEWABLE ORAL at 09:29

## 2019-03-22 RX ADMIN — RISPERIDONE 0.5 MILLIGRAM(S): 4 TABLET ORAL at 09:29

## 2019-03-22 RX ADMIN — Medication 25 MILLIGRAM(S): at 20:54

## 2019-03-22 RX ADMIN — RISPERIDONE 0.5 MILLIGRAM(S): 4 TABLET ORAL at 17:27

## 2019-03-22 RX ADMIN — RISPERIDONE 0.5 MILLIGRAM(S): 4 TABLET ORAL at 12:42

## 2019-03-22 RX ADMIN — SIMETHICONE 160 MILLIGRAM(S): 80 TABLET, CHEWABLE ORAL at 12:42

## 2019-03-22 RX ADMIN — AMLODIPINE BESYLATE 5 MILLIGRAM(S): 2.5 TABLET ORAL at 09:29

## 2019-03-22 NOTE — PROGRESS NOTE BEHAVIORAL HEALTH - NSBHCHARTREVIEWVS_PSY_A_CORE FT
Vital Signs Last 24 Hrs  T(C): 38.1 (22 Mar 2019 09:23), Max: 38.1 (22 Mar 2019 09:23)  T(F): 100.5 (22 Mar 2019 09:23), Max: 100.5 (22 Mar 2019 09:23)  HR: --  BP: --  BP(mean): --  RR: 14 (22 Mar 2019 09:23) (14 - 14)  SpO2: 100% (22 Mar 2019 09:23) (100% - 100%)

## 2019-03-22 NOTE — CHART NOTE - NSCHARTNOTEFT_GEN_A_CORE
Patient was scheduled for discharge on Monday, however, spoke with patient's daughter, Dr. Shenle Roney to discuss discharge.  She states she is out of the country but will be back on Sunday.  She states he has no where to go and is concerned that he can not care for himself in the community.  She would like to speak with Dr. Suárez to discuss his condition, diagnosis and ability to care for himself.  She states he has no money for housing but they are willing to help him but need to know more about his condition.  Ultimately, they would like to send him back to his country and are waiting on his passport.  Given the circumstances of an unsafe discharge, patient will remain on 5N so  can work on a safe discharge plan.  Discussed with Dr. Suárez who agrees.  Provided Dr. Sim's number for Dr. Suárez to call.  Will hand off to Debbie Pike LCSW

## 2019-03-22 NOTE — CHART NOTE - NSCHARTNOTEFT_GEN_A_CORE
ALINA spoke to Leticia (ph. [phone]) at [facility] geriatric outpt. clinic to discuss referral made for pt. to obtain outpt. appointment. ALINA updated Leticia that. pt. likely not DC on Monday due to housing. Leticia agreed to have referral in system for future appt. SW to call with updated DC date for appt.

## 2019-03-22 NOTE — PROGRESS NOTE BEHAVIORAL HEALTH - NSBHFUPINTERVALHXFT_PSY_A_CORE
Pt is homeless . Pt with forgetfullness and needing redirection.Pt denies any paranoid thoughts. at this time Pt with difficulty in self management

## 2019-03-22 NOTE — PROGRESS NOTE BEHAVIORAL HEALTH - SUMMARY
Patient is a 65 y/o AA M, , has 3 adult children, unemployed, domiciled alone for one week (previously living with wife), with unknown PPhx, denies hx of inpt hospitalizations, denies suicide attempts, denies violence hx, denies legal problems, denies substance use, and PMhx of HTN and DM. Patient presents today brought in by EMS after he was found on the streets reporting that a cult is after him. On evaluation pt reports complex paranoid delusions and associated AH that he is being targeted by a cult/gang and the  of [facility], and that he is unsafe at home and has to give up his house. He is unable to engage in safety planning or care for himself due to his psychiatric symptoms. At this time pt requires inpt hospitalization for safety and stabilization.

## 2019-03-23 LAB — GLUCOSE BLDC GLUCOMTR-MCNC: 159 MG/DL — HIGH (ref 70–99)

## 2019-03-23 RX ADMIN — SIMETHICONE 160 MILLIGRAM(S): 80 TABLET, CHEWABLE ORAL at 20:27

## 2019-03-23 RX ADMIN — RISPERIDONE 0.5 MILLIGRAM(S): 4 TABLET ORAL at 20:26

## 2019-03-23 RX ADMIN — SIMETHICONE 160 MILLIGRAM(S): 80 TABLET, CHEWABLE ORAL at 12:39

## 2019-03-23 RX ADMIN — RISPERIDONE 0.5 MILLIGRAM(S): 4 TABLET ORAL at 12:38

## 2019-03-23 RX ADMIN — SIMETHICONE 160 MILLIGRAM(S): 80 TABLET, CHEWABLE ORAL at 09:17

## 2019-03-23 RX ADMIN — METFORMIN HYDROCHLORIDE 500 MILLIGRAM(S): 850 TABLET ORAL at 20:26

## 2019-03-23 RX ADMIN — Medication 25 MILLIGRAM(S): at 20:26

## 2019-03-23 RX ADMIN — RISPERIDONE 0.5 MILLIGRAM(S): 4 TABLET ORAL at 09:17

## 2019-03-23 RX ADMIN — METFORMIN HYDROCHLORIDE 500 MILLIGRAM(S): 850 TABLET ORAL at 09:17

## 2019-03-23 RX ADMIN — AMLODIPINE BESYLATE 5 MILLIGRAM(S): 2.5 TABLET ORAL at 09:17

## 2019-03-23 RX ADMIN — RISPERIDONE 0.5 MILLIGRAM(S): 4 TABLET ORAL at 17:07

## 2019-03-23 RX ADMIN — MEMANTINE HYDROCHLORIDE 5 MILLIGRAM(S): 10 TABLET ORAL at 09:17

## 2019-03-24 LAB — GLUCOSE BLDC GLUCOMTR-MCNC: 167 MG/DL — HIGH (ref 70–99)

## 2019-03-24 RX ADMIN — SIMETHICONE 160 MILLIGRAM(S): 80 TABLET, CHEWABLE ORAL at 09:13

## 2019-03-24 RX ADMIN — Medication 25 MILLIGRAM(S): at 22:13

## 2019-03-24 RX ADMIN — SIMETHICONE 160 MILLIGRAM(S): 80 TABLET, CHEWABLE ORAL at 22:13

## 2019-03-24 RX ADMIN — RISPERIDONE 0.5 MILLIGRAM(S): 4 TABLET ORAL at 22:11

## 2019-03-24 RX ADMIN — AMLODIPINE BESYLATE 5 MILLIGRAM(S): 2.5 TABLET ORAL at 09:12

## 2019-03-24 RX ADMIN — MEMANTINE HYDROCHLORIDE 5 MILLIGRAM(S): 10 TABLET ORAL at 09:12

## 2019-03-24 RX ADMIN — METFORMIN HYDROCHLORIDE 500 MILLIGRAM(S): 850 TABLET ORAL at 09:12

## 2019-03-24 RX ADMIN — RISPERIDONE 0.5 MILLIGRAM(S): 4 TABLET ORAL at 14:22

## 2019-03-24 RX ADMIN — METFORMIN HYDROCHLORIDE 500 MILLIGRAM(S): 850 TABLET ORAL at 22:11

## 2019-03-24 RX ADMIN — RISPERIDONE 0.5 MILLIGRAM(S): 4 TABLET ORAL at 09:13

## 2019-03-24 RX ADMIN — SIMETHICONE 160 MILLIGRAM(S): 80 TABLET, CHEWABLE ORAL at 14:23

## 2019-03-25 PROCEDURE — 99232 SBSQ HOSP IP/OBS MODERATE 35: CPT

## 2019-03-25 RX ORDER — RISPERIDONE 4 MG/1
0.5 TABLET ORAL THREE TIMES A DAY
Qty: 0 | Refills: 0 | Status: DISCONTINUED | OUTPATIENT
Start: 2019-03-25 | End: 2019-04-12

## 2019-03-25 RX ORDER — MEMANTINE HYDROCHLORIDE 10 MG/1
5 TABLET ORAL
Qty: 0 | Refills: 0 | Status: DISCONTINUED | OUTPATIENT
Start: 2019-03-25 | End: 2019-03-28

## 2019-03-25 RX ADMIN — RISPERIDONE 0.5 MILLIGRAM(S): 4 TABLET ORAL at 09:23

## 2019-03-25 RX ADMIN — SIMETHICONE 160 MILLIGRAM(S): 80 TABLET, CHEWABLE ORAL at 09:24

## 2019-03-25 RX ADMIN — AMLODIPINE BESYLATE 5 MILLIGRAM(S): 2.5 TABLET ORAL at 09:23

## 2019-03-25 RX ADMIN — MEMANTINE HYDROCHLORIDE 5 MILLIGRAM(S): 10 TABLET ORAL at 21:45

## 2019-03-25 RX ADMIN — RISPERIDONE 0.5 MILLIGRAM(S): 4 TABLET ORAL at 21:45

## 2019-03-25 RX ADMIN — METFORMIN HYDROCHLORIDE 500 MILLIGRAM(S): 850 TABLET ORAL at 09:23

## 2019-03-25 RX ADMIN — SIMETHICONE 160 MILLIGRAM(S): 80 TABLET, CHEWABLE ORAL at 13:12

## 2019-03-25 RX ADMIN — SIMETHICONE 160 MILLIGRAM(S): 80 TABLET, CHEWABLE ORAL at 21:45

## 2019-03-25 RX ADMIN — RISPERIDONE 0.5 MILLIGRAM(S): 4 TABLET ORAL at 13:12

## 2019-03-25 RX ADMIN — MEMANTINE HYDROCHLORIDE 5 MILLIGRAM(S): 10 TABLET ORAL at 09:23

## 2019-03-25 RX ADMIN — METFORMIN HYDROCHLORIDE 500 MILLIGRAM(S): 850 TABLET ORAL at 21:45

## 2019-03-25 RX ADMIN — Medication 25 MILLIGRAM(S): at 21:45

## 2019-03-25 NOTE — PROGRESS NOTE BEHAVIORAL HEALTH - NSBHCHARTREVIEWVS_PSY_A_CORE FT
Vital Signs Last 24 Hrs  T(C): 37 (25 Mar 2019 08:14), Max: 37 (25 Mar 2019 08:14)  T(F): 98.6 (25 Mar 2019 08:14), Max: 98.6 (25 Mar 2019 08:14)  HR: --  BP: --  BP(mean): --  RR: 14 (25 Mar 2019 08:14) (14 - 14)  SpO2: 100% (25 Mar 2019 08:14) (100% - 100%)

## 2019-03-25 NOTE — PROGRESS NOTE BEHAVIORAL HEALTH - NSBHFUPINTERVALHXFT_PSY_A_CORE
Pt still with want to return to his apt but pt with forgetfulness even on the unit and keeps on misplacing his tray at the nursing station.  Pt remaining in control and not more preoccupation with delusional issues.  Will decrease the risperdal and increase the namenda

## 2019-03-25 NOTE — PROGRESS NOTE BEHAVIORAL HEALTH - SUMMARY
Patient is a 65 y/o AA M, , has 3 adult children, unemployed, domiciled alone for one week (previously living with wife), with unknown PPhx, denies hx of inpt hospitalizations, denies suicide attempts, denies violence hx, denies legal problems, denies substance use, and PMhx of HTN and DM. Patient presents today brought in by EMS after he was found on the streets reporting that a cult is after him. On evaluation pt reports complex paranoid delusions and associated AH that he is being targeted by a cult/gang and the  of [facility], and that he is unsafe at home and has to give up his house. He is unable to engage in safety planning or care for himself due to his psychiatric symptoms. At this time pt requires inpt hospitalization for safety and stabilization.

## 2019-03-26 LAB — GLUCOSE BLDC GLUCOMTR-MCNC: 132 MG/DL — HIGH (ref 70–99)

## 2019-03-26 PROCEDURE — 99231 SBSQ HOSP IP/OBS SF/LOW 25: CPT

## 2019-03-26 RX ADMIN — METFORMIN HYDROCHLORIDE 500 MILLIGRAM(S): 850 TABLET ORAL at 08:33

## 2019-03-26 RX ADMIN — MEMANTINE HYDROCHLORIDE 5 MILLIGRAM(S): 10 TABLET ORAL at 21:01

## 2019-03-26 RX ADMIN — AMLODIPINE BESYLATE 5 MILLIGRAM(S): 2.5 TABLET ORAL at 08:33

## 2019-03-26 RX ADMIN — RISPERIDONE 0.5 MILLIGRAM(S): 4 TABLET ORAL at 08:33

## 2019-03-26 RX ADMIN — RISPERIDONE 0.5 MILLIGRAM(S): 4 TABLET ORAL at 13:09

## 2019-03-26 RX ADMIN — SIMETHICONE 160 MILLIGRAM(S): 80 TABLET, CHEWABLE ORAL at 21:01

## 2019-03-26 RX ADMIN — RISPERIDONE 0.5 MILLIGRAM(S): 4 TABLET ORAL at 21:01

## 2019-03-26 RX ADMIN — MEMANTINE HYDROCHLORIDE 5 MILLIGRAM(S): 10 TABLET ORAL at 08:33

## 2019-03-26 RX ADMIN — Medication 25 MILLIGRAM(S): at 21:01

## 2019-03-26 RX ADMIN — SIMETHICONE 160 MILLIGRAM(S): 80 TABLET, CHEWABLE ORAL at 13:10

## 2019-03-26 RX ADMIN — SIMETHICONE 160 MILLIGRAM(S): 80 TABLET, CHEWABLE ORAL at 08:33

## 2019-03-26 RX ADMIN — METFORMIN HYDROCHLORIDE 500 MILLIGRAM(S): 850 TABLET ORAL at 21:01

## 2019-03-26 NOTE — PROGRESS NOTE BEHAVIORAL HEALTH - NSBHFUPINTERVALHXFT_PSY_A_CORE
Pt seen in private space by this writer, hygiene and grooming is fair, he reports "I am tired today". Patient rates his current mood as a 6/10, w/ 10 representing the best mood possible, denies diurnal mood variation. Patient denies any psychotic symptoms including paranoia delusions.

## 2019-03-26 NOTE — PROGRESS NOTE BEHAVIORAL HEALTH - SUMMARY
Patient is a 65 y/o AA M, , has 3 adult children, unemployed, domiciled alone for one week (previously living with wife), with unknown PPhx, denies hx of inpt hospitalizations, denies suicide attempts, denies violence hx, denies legal problems, denies substance use, and PMhx of HTN and DM. Patient presents today brought in by EMS after he was found on the streets reporting that a cult is after him. On evaluation pt reports complex paranoid delusions and associated AH that he is being targeted by a cult/gang and the  of [facility], and that he is unsafe at home and has to give up his house. He is unable to engage in safety planning or care for himself due to his psychiatric symptoms. At this time pt requires inpt hospitalization for safety and stabilization.

## 2019-03-26 NOTE — PROGRESS NOTE BEHAVIORAL HEALTH - NSBHCHARTREVIEWVS_PSY_A_CORE FT
Vital Signs Last 24 Hrs  T(C): 36.9 (26 Mar 2019 08:04), Max: 36.9 (26 Mar 2019 08:04)  T(F): 98.5 (26 Mar 2019 08:04), Max: 98.5 (26 Mar 2019 08:04)  RR: 14 (26 Mar 2019 08:04) (14 - 14)  SpO2: 99% (26 Mar 2019 08:04) (99% - 99%) Orthostatics:   Sitting 141/81 HR 63  Standing 136+1/77 HR 68    Vital Signs Last 24 Hrs  T(C): 36.9 (26 Mar 2019 08:04), Max: 36.9 (26 Mar 2019 08:04)  T(F): 98.5 (26 Mar 2019 08:04), Max: 98.5 (26 Mar 2019 08:04)  RR: 14 (26 Mar 2019 08:04) (14 - 14)  SpO2: 99% (26 Mar 2019 08:04) (99% - 99%)

## 2019-03-27 DIAGNOSIS — G47.00 INSOMNIA, UNSPECIFIED: ICD-10-CM

## 2019-03-27 PROCEDURE — 99231 SBSQ HOSP IP/OBS SF/LOW 25: CPT

## 2019-03-27 PROCEDURE — 99232 SBSQ HOSP IP/OBS MODERATE 35: CPT

## 2019-03-27 RX ADMIN — Medication 25 MILLIGRAM(S): at 21:02

## 2019-03-27 RX ADMIN — SIMETHICONE 160 MILLIGRAM(S): 80 TABLET, CHEWABLE ORAL at 09:01

## 2019-03-27 RX ADMIN — MEMANTINE HYDROCHLORIDE 5 MILLIGRAM(S): 10 TABLET ORAL at 09:01

## 2019-03-27 RX ADMIN — AMLODIPINE BESYLATE 5 MILLIGRAM(S): 2.5 TABLET ORAL at 09:01

## 2019-03-27 RX ADMIN — MEMANTINE HYDROCHLORIDE 5 MILLIGRAM(S): 10 TABLET ORAL at 21:03

## 2019-03-27 RX ADMIN — RISPERIDONE 0.5 MILLIGRAM(S): 4 TABLET ORAL at 09:10

## 2019-03-27 RX ADMIN — RISPERIDONE 0.5 MILLIGRAM(S): 4 TABLET ORAL at 21:03

## 2019-03-27 RX ADMIN — SIMETHICONE 160 MILLIGRAM(S): 80 TABLET, CHEWABLE ORAL at 21:03

## 2019-03-27 RX ADMIN — SIMETHICONE 160 MILLIGRAM(S): 80 TABLET, CHEWABLE ORAL at 12:35

## 2019-03-27 RX ADMIN — METFORMIN HYDROCHLORIDE 500 MILLIGRAM(S): 850 TABLET ORAL at 21:02

## 2019-03-27 RX ADMIN — RISPERIDONE 0.5 MILLIGRAM(S): 4 TABLET ORAL at 12:35

## 2019-03-27 RX ADMIN — METFORMIN HYDROCHLORIDE 500 MILLIGRAM(S): 850 TABLET ORAL at 09:01

## 2019-03-27 NOTE — PROGRESS NOTE BEHAVIORAL HEALTH - NSBHFUPINTERVALHXFT_PSY_A_CORE
Pt seen by this writer in a private space, he is calm, eye contact grooming and hygiene is poor. Patient continues to appear confused, he has difficulty responding to questions posed by this writer. Patient occasionally confabulates in an attempt to hide cognitive deficits and memory loss. Patient at this time denies any psychotic symptoms including paranoia, ideas of reference, thought insertion/broadcasting, or auditory/visual/olfactory/tactile/gustatory hallucinations. Pt seen by this writer in a private space, he is calm, cooperative, eye contact, grooming and hygiene is fair. Patient continues to appear confused at times, he occasionally has difficulty responding to questions posed with confabulation as if attempting to hide cognitive deficits and memory loss. Patient currently denies any psychotic symptoms including paranoia, ideas of reference, thought insertion/broadcasting, or auditory/visual/olfactory/tactile/gustatory hallucinations.: Pt is reported to be attending therapy groups; yesterday he attended community meeting, stress management, yoga with meditation group.

## 2019-03-27 NOTE — PROGRESS NOTE BEHAVIORAL HEALTH - PROBLEM SELECTOR PLAN 2
Plan: 1. Namenda 5 mg po bid  2. Organic work up (Ct of head, MRI of head w/o contrast) Plan: 1. Namenda 5 mg po bid  2. Organic work up w lab orders for B12, folate, magnesium Plan: 1. Namenda 5 mg po bid  2. Organic work up w lab orders for B12, folate, magnesium, Vit D Plan: 1. started Namenda 5 mg po bid  2. Organic work up w lab orders for B12, folate, magnesium, Vit D

## 2019-03-27 NOTE — PROGRESS NOTE BEHAVIORAL HEALTH - SUMMARY
Patient is a 65 y/o AA M, , has 3 adult children, unemployed, domiciled alone for one week (previously living with wife), with unknown PPhx, denies hx of inpt hospitalizations, denies suicide attempts, denies violence hx, denies legal problems, denies substance use, and PMhx of HTN and DM. Patient presents today brought in by EMS after he was found on the streets reporting that a cult is after him. On evaluation pt reports complex paranoid delusions and associated AH that he is being targeted by a cult/gang and the  of [facility], and that he is unsafe at home and has to give up his house. He is unable to engage in safety planning or care for himself due to his psychiatric symptoms. At this time pt requires inpt hospitalization for safety and stabilization.    3.27 Pt is calm, eye contact and grooming is poor, confused at times, difficulty responding to questions, occasionally he confabulates to hide memory and cognitive deficits. Patient denies any psychotic symptoms. Plan: r/o organic cause, Repeat Ct of head (last in 2/2019), MRI w/o contrast to be ordered. Patient is a 63 y/o AA M, , has 3 adult children, unemployed, domiciled alone for one week (previously living with wife), with unknown PPhx, denies hx of inpt hospitalizations, denies suicide attempts, denies violence hx, denies legal problems, denies substance use, and PMhx of HTN and DM. Patient presents today brought in by EMS after he was found on the streets reporting that a cult is after him. On evaluation pt reports complex paranoid delusions and associated AH that he is being [facility] by a [facility]/gang and the  [facility], and that he is unsafe at home and has to give up his house. He is unable to engage in safety planning or care for himself due to his psychiatric symptoms. At this time pt requires inpt hospitalization for safety and stabilization.    3.27 Pt is calm, eye contact & grooming is fair, at times he is confused w/ difficulty responding to questions, occasionally confabulates to hide memory and cognitive deficits. Denies any psychotic symptoms. Pt is reported to be attending therapy groups. Compliant w/ medication regimen, tolerating well.    Plan: r/o organic cause w/ B12, folate, magnesium labs. Patient is a 65 y/o AA M, , has 3 adult children, unemployed, domiciled alone for one week (previously living with wife), with unknown PPhx, denies hx of inpt hospitalizations, denies suicide attempts, denies violence hx, denies legal problems, denies substance use, and PMhx of HTN and DM. Patient presents today brought in by EMS after he was found on the streets reporting that a cult is after him. On evaluation pt reports complex paranoid delusions and associated AH that he is being targeted by a cult/gang and the  [facility], and that he is unsafe at home and has to give up his house. He is unable to engage in safety planning or care for himself due to his psychiatric symptoms. At this time pt requires inpt hospitalization for safety and stabilization.    3.27 Pt is calm, eye contact & grooming is fair, at times he is confused w/ difficulty responding to questions, occasionally confabulates to hide memory and cognitive deficits. Denies any psychotic symptoms. Pt is reported to be attending therapy groups. Compliant w/ medication regimen, tolerating well.    Plan: r/o organic cause w/ B12, folate, magnesium, Vitamin D labs.

## 2019-03-27 NOTE — PROGRESS NOTE BEHAVIORAL HEALTH - NSBHCHARTREVIEWVS_PSY_A_CORE FT
Vital Signs Last 24 Hrs  T(C): 37.3 (27 Mar 2019 08:27), Max: 37.3 (27 Mar 2019 08:27)  T(F): 99.2 (27 Mar 2019 08:27), Max: 99.2 (27 Mar 2019 08:27)  RR: 14 (27 Mar 2019 08:27) (14 - 14)  SpO2: 99% (27 Mar 2019 08:27) (99% - 99%) Orthostatics:  Sitting 146/70 HR 72  Standing 124/72 HR 73    V/S last 24-hours  T(C): 37.3 (27 Mar 2019 08:27), Max: 37.3 (27 Mar 2019 08:27)  T(F): 99.2 (27 Mar 2019 08:27), Max: 99.2 (27 Mar 2019 08:27)  RR: 14 (27 Mar 2019 08:27) (14 - 14)  SpO2: 99% (27 Mar 2019 08:27) (99% - 99%)

## 2019-03-27 NOTE — PROGRESS NOTE BEHAVIORAL HEALTH - NSBHADMITDANGERSELF_PSY_A_CORE
unable to care for self

## 2019-03-27 NOTE — PROGRESS NOTE BEHAVIORAL HEALTH - NSBHCHARTREVIEWIMAGING_PSY_A_CORE FT
< from: CT Head No Cont (02.25.19 @ 03:11) >  EXAM:  CT BRAIN                          PROCEDURE DATE:  02/25/2019      INTERPRETATION:  CLINICAL INFORMATION: Altered mental status, bizarre   behavior.    COMPARISON: None available.    PROCEDURE:   Noncontrast CT of the Head was performed from the skull base to the   vertex. Coronal and Sagittal reformats were obtained.    FINDINGS:    There is no acute intracranial hemorrhage, mass effect, midline shift,   extra-axial collection, hydrocephalus, or evidence of acute vascular   territorial infarction.    Mild bilateral maxillary sinus mucosal thickening. Remainder the   paranasal sinuses and mastoid air cells are clear. Intraorbital contents   are unremarkable. Visualized osseous structures are intact.    IMPRESSION:     No acute intracranial hemorrhage, mass effect, or evidence of acute   vascular territorial infarction.    If clinical symptoms persist or worsen, more sensitive evaluation with   brain MRI may be obtained, if no contraindications exist.    KAE ELLINGTON M.D., ATTENDING RADIOLOGIST  This document has been electronically signed. Feb 25 2019  3:22AM    < end of copied text >

## 2019-03-28 LAB
FOLATE SERPL-MCNC: 16.2 NG/ML — SIGNIFICANT CHANGE UP
GLUCOSE BLDC GLUCOMTR-MCNC: 140 MG/DL — HIGH (ref 70–99)
MAGNESIUM SERPL-MCNC: 2.1 MG/DL — SIGNIFICANT CHANGE UP (ref 1.6–2.6)
VIT B12 SERPL-MCNC: 390 PG/ML — SIGNIFICANT CHANGE UP (ref 232–1245)
VIT D25+D1,25 OH+D1,25 PNL SERPL-MCNC: 70.8 PG/ML — SIGNIFICANT CHANGE UP (ref 19.9–79.3)

## 2019-03-28 PROCEDURE — 99232 SBSQ HOSP IP/OBS MODERATE 35: CPT

## 2019-03-28 RX ORDER — MEMANTINE HYDROCHLORIDE 10 MG/1
5 TABLET ORAL AT BEDTIME
Qty: 0 | Refills: 0 | Status: DISCONTINUED | OUTPATIENT
Start: 2019-03-28 | End: 2019-04-12

## 2019-03-28 RX ADMIN — RISPERIDONE 0.5 MILLIGRAM(S): 4 TABLET ORAL at 21:39

## 2019-03-28 RX ADMIN — METFORMIN HYDROCHLORIDE 500 MILLIGRAM(S): 850 TABLET ORAL at 21:38

## 2019-03-28 RX ADMIN — SIMETHICONE 160 MILLIGRAM(S): 80 TABLET, CHEWABLE ORAL at 13:23

## 2019-03-28 RX ADMIN — METFORMIN HYDROCHLORIDE 500 MILLIGRAM(S): 850 TABLET ORAL at 08:31

## 2019-03-28 RX ADMIN — RISPERIDONE 0.5 MILLIGRAM(S): 4 TABLET ORAL at 08:32

## 2019-03-28 RX ADMIN — Medication 25 MILLIGRAM(S): at 21:57

## 2019-03-28 RX ADMIN — MEMANTINE HYDROCHLORIDE 5 MILLIGRAM(S): 10 TABLET ORAL at 08:31

## 2019-03-28 RX ADMIN — SIMETHICONE 160 MILLIGRAM(S): 80 TABLET, CHEWABLE ORAL at 21:38

## 2019-03-28 RX ADMIN — SIMETHICONE 160 MILLIGRAM(S): 80 TABLET, CHEWABLE ORAL at 08:32

## 2019-03-28 RX ADMIN — RISPERIDONE 0.5 MILLIGRAM(S): 4 TABLET ORAL at 13:22

## 2019-03-28 RX ADMIN — AMLODIPINE BESYLATE 5 MILLIGRAM(S): 2.5 TABLET ORAL at 08:31

## 2019-03-28 NOTE — PROGRESS NOTE BEHAVIORAL HEALTH - NSBHFUPINTERVALHXFT_PSY_A_CORE
Pt feeling some sedation , will move the Namenda to bedtime. Pt with preoccupation of wanting to return home. Pt with some limitation to his ability to care for himself.  Pt claims he is no longer so concerned about prior delusional thoughts and claims he would be feeling safe in the community.

## 2019-03-28 NOTE — PROGRESS NOTE BEHAVIORAL HEALTH - NSBHCHARTREVIEWVS_PSY_A_CORE FT
Vital Signs Last 24 Hrs  T(C): 37.1 (28 Mar 2019 07:49), Max: 37.1 (28 Mar 2019 07:49)  T(F): 98.7 (28 Mar 2019 07:49), Max: 98.7 (28 Mar 2019 07:49)  HR: --  BP: --141/70  BP(mean): --  RR: 16 (28 Mar 2019 07:49) (16 - 16)  SpO2: 100% (28 Mar 2019 07:49) (100% - 100%)

## 2019-03-29 DIAGNOSIS — Z51.89 ENCOUNTER FOR OTHER SPECIFIED AFTERCARE: ICD-10-CM

## 2019-03-29 LAB — GLUCOSE BLDC GLUCOMTR-MCNC: 127 MG/DL — HIGH (ref 70–99)

## 2019-03-29 PROCEDURE — 99232 SBSQ HOSP IP/OBS MODERATE 35: CPT

## 2019-03-29 RX ADMIN — SIMETHICONE 160 MILLIGRAM(S): 80 TABLET, CHEWABLE ORAL at 21:00

## 2019-03-29 RX ADMIN — RISPERIDONE 0.5 MILLIGRAM(S): 4 TABLET ORAL at 09:07

## 2019-03-29 RX ADMIN — AMLODIPINE BESYLATE 5 MILLIGRAM(S): 2.5 TABLET ORAL at 09:08

## 2019-03-29 RX ADMIN — MEMANTINE HYDROCHLORIDE 5 MILLIGRAM(S): 10 TABLET ORAL at 21:00

## 2019-03-29 RX ADMIN — SIMETHICONE 160 MILLIGRAM(S): 80 TABLET, CHEWABLE ORAL at 12:24

## 2019-03-29 RX ADMIN — RISPERIDONE 0.5 MILLIGRAM(S): 4 TABLET ORAL at 20:59

## 2019-03-29 RX ADMIN — RISPERIDONE 0.5 MILLIGRAM(S): 4 TABLET ORAL at 12:24

## 2019-03-29 RX ADMIN — METFORMIN HYDROCHLORIDE 500 MILLIGRAM(S): 850 TABLET ORAL at 09:07

## 2019-03-29 RX ADMIN — SIMETHICONE 160 MILLIGRAM(S): 80 TABLET, CHEWABLE ORAL at 09:07

## 2019-03-29 RX ADMIN — METFORMIN HYDROCHLORIDE 500 MILLIGRAM(S): 850 TABLET ORAL at 21:00

## 2019-03-29 RX ADMIN — Medication 25 MILLIGRAM(S): at 20:59

## 2019-03-29 NOTE — PROGRESS NOTE BEHAVIORAL HEALTH - NSBHFUPINTERVALHXFT_PSY_A_CORE
Administered the DSRS and pt scored 21 in the moderate range. Spoke with the daughter about the DSRS and she was not  surprised .  She is planning to be in next week to give some plan as to where the pt will be going to live.   Pt continue with the delusion of people who are still looking for him but he expressed no concern and that it was no longer bothering him.

## 2019-03-29 NOTE — PROGRESS NOTE BEHAVIORAL HEALTH - SUMMARY
Patient is a 63 y/o AA M, , has 3 adult children, unemployed, domiciled alone for one week (previously living with wife), with unknown PPhx, denies hx of inpt hospitalizations, denies suicide attempts, denies violence hx, denies legal problems, denies substance use, and PMhx of HTN and DM. Patient presents today brought in by EMS after he was found on the streets reporting that a cult is after him. On evaluation pt reports complex paranoid delusions and associated AH that he is being targeted by a cult/gang and the  of [facility], and that he is unsafe at home and has to give up his house. He is unable to engage in safety planning or care for himself due to his psychiatric symptoms. At this time pt requires inpt hospitalization for safety and stabilization.

## 2019-03-29 NOTE — PROGRESS NOTE BEHAVIORAL HEALTH - PROBLEM SELECTOR PLAN 3
1. spoke with his daughter Toyin Sim -[phone] aftercare planning and where the pt will be living

## 2019-03-29 NOTE — PROGRESS NOTE BEHAVIORAL HEALTH - NSBHCHARTREVIEWVS_PSY_A_CORE FT
Vital Signs Last 24 Hrs  T(C): 36.7 (29 Mar 2019 07:42), Max: 36.7 (29 Mar 2019 07:42)  T(F): 98.1 (29 Mar 2019 07:42), Max: 98.1 (29 Mar 2019 07:42)  HR: --  BP: --151/71  BP(mean): --  RR: 14 (29 Mar 2019 07:42) (14 - 14)  SpO2: 98% (29 Mar 2019 07:42) (98% - 98%)

## 2019-03-30 RX ADMIN — AMLODIPINE BESYLATE 5 MILLIGRAM(S): 2.5 TABLET ORAL at 09:02

## 2019-03-30 RX ADMIN — METFORMIN HYDROCHLORIDE 500 MILLIGRAM(S): 850 TABLET ORAL at 09:02

## 2019-03-30 RX ADMIN — SIMETHICONE 160 MILLIGRAM(S): 80 TABLET, CHEWABLE ORAL at 13:51

## 2019-03-30 RX ADMIN — SIMETHICONE 160 MILLIGRAM(S): 80 TABLET, CHEWABLE ORAL at 21:37

## 2019-03-30 RX ADMIN — METFORMIN HYDROCHLORIDE 500 MILLIGRAM(S): 850 TABLET ORAL at 21:37

## 2019-03-30 RX ADMIN — RISPERIDONE 0.5 MILLIGRAM(S): 4 TABLET ORAL at 13:51

## 2019-03-30 RX ADMIN — MEMANTINE HYDROCHLORIDE 5 MILLIGRAM(S): 10 TABLET ORAL at 21:37

## 2019-03-30 RX ADMIN — SIMETHICONE 160 MILLIGRAM(S): 80 TABLET, CHEWABLE ORAL at 09:02

## 2019-03-30 RX ADMIN — RISPERIDONE 0.5 MILLIGRAM(S): 4 TABLET ORAL at 09:02

## 2019-03-30 RX ADMIN — Medication 25 MILLIGRAM(S): at 21:37

## 2019-03-30 RX ADMIN — RISPERIDONE 0.5 MILLIGRAM(S): 4 TABLET ORAL at 21:37

## 2019-03-31 RX ADMIN — MEMANTINE HYDROCHLORIDE 5 MILLIGRAM(S): 10 TABLET ORAL at 20:45

## 2019-03-31 RX ADMIN — Medication 25 MILLIGRAM(S): at 20:45

## 2019-03-31 RX ADMIN — AMLODIPINE BESYLATE 5 MILLIGRAM(S): 2.5 TABLET ORAL at 09:00

## 2019-03-31 RX ADMIN — METFORMIN HYDROCHLORIDE 500 MILLIGRAM(S): 850 TABLET ORAL at 20:45

## 2019-03-31 RX ADMIN — SIMETHICONE 160 MILLIGRAM(S): 80 TABLET, CHEWABLE ORAL at 12:53

## 2019-03-31 RX ADMIN — METFORMIN HYDROCHLORIDE 500 MILLIGRAM(S): 850 TABLET ORAL at 09:00

## 2019-03-31 RX ADMIN — RISPERIDONE 0.5 MILLIGRAM(S): 4 TABLET ORAL at 12:53

## 2019-03-31 RX ADMIN — SIMETHICONE 160 MILLIGRAM(S): 80 TABLET, CHEWABLE ORAL at 20:45

## 2019-03-31 RX ADMIN — SIMETHICONE 160 MILLIGRAM(S): 80 TABLET, CHEWABLE ORAL at 09:00

## 2019-03-31 RX ADMIN — RISPERIDONE 0.5 MILLIGRAM(S): 4 TABLET ORAL at 20:45

## 2019-04-01 PROCEDURE — 99232 SBSQ HOSP IP/OBS MODERATE 35: CPT

## 2019-04-01 PROCEDURE — 99231 SBSQ HOSP IP/OBS SF/LOW 25: CPT

## 2019-04-01 RX ADMIN — SIMETHICONE 160 MILLIGRAM(S): 80 TABLET, CHEWABLE ORAL at 20:49

## 2019-04-01 RX ADMIN — METFORMIN HYDROCHLORIDE 500 MILLIGRAM(S): 850 TABLET ORAL at 09:57

## 2019-04-01 RX ADMIN — SIMETHICONE 160 MILLIGRAM(S): 80 TABLET, CHEWABLE ORAL at 09:57

## 2019-04-01 RX ADMIN — RISPERIDONE 0.5 MILLIGRAM(S): 4 TABLET ORAL at 13:39

## 2019-04-01 RX ADMIN — Medication 25 MILLIGRAM(S): at 20:49

## 2019-04-01 RX ADMIN — RISPERIDONE 0.5 MILLIGRAM(S): 4 TABLET ORAL at 20:49

## 2019-04-01 RX ADMIN — AMLODIPINE BESYLATE 5 MILLIGRAM(S): 2.5 TABLET ORAL at 09:57

## 2019-04-01 RX ADMIN — MEMANTINE HYDROCHLORIDE 5 MILLIGRAM(S): 10 TABLET ORAL at 20:49

## 2019-04-01 RX ADMIN — METFORMIN HYDROCHLORIDE 500 MILLIGRAM(S): 850 TABLET ORAL at 20:49

## 2019-04-01 RX ADMIN — SIMETHICONE 160 MILLIGRAM(S): 80 TABLET, CHEWABLE ORAL at 13:39

## 2019-04-01 RX ADMIN — RISPERIDONE 0.5 MILLIGRAM(S): 4 TABLET ORAL at 09:57

## 2019-04-01 NOTE — PROGRESS NOTE BEHAVIORAL HEALTH - SUMMARY
Patient is a 65 y/o AA M, , has 3 adult children, unemployed, domiciled alone for one week (previously living with wife), with unknown PPhx, denies hx of inpt hospitalizations, denies suicide attempts, denies violence hx, denies legal problems, denies substance use, and PMhx of HTN and DM. Patient presents today brought in by EMS after he was found on the streets reporting that a cult is after him. On evaluation pt reports complex paranoid delusions and associated AH that he is being targeted by a cult/gang and the  of [facility], and that he is unsafe at home and has to give up his house. He is unable to engage in safety planning or care for himself due to his psychiatric symptoms. At this time pt requires inpt hospitalization for safety and stabilization.

## 2019-04-01 NOTE — PROGRESS NOTE BEHAVIORAL HEALTH - NSBHFUPINTERVALHXFT_PSY_A_CORE
Pt family willing to take the pt to a nursing facility or [place]  Pt with poor insight and judgment.  Pt with memory problem that affect his ability to care for himself with no supervision

## 2019-04-01 NOTE — PROGRESS NOTE BEHAVIORAL HEALTH - PROBLEM SELECTOR PLAN 3
1. spoke with his daughter Toyin Sim -[phone] aftercare planning and where the pt will be living

## 2019-04-02 LAB — GLUCOSE BLDC GLUCOMTR-MCNC: 181 MG/DL — HIGH (ref 70–99)

## 2019-04-02 PROCEDURE — 99231 SBSQ HOSP IP/OBS SF/LOW 25: CPT

## 2019-04-02 RX ADMIN — METFORMIN HYDROCHLORIDE 500 MILLIGRAM(S): 850 TABLET ORAL at 21:09

## 2019-04-02 RX ADMIN — SIMETHICONE 160 MILLIGRAM(S): 80 TABLET, CHEWABLE ORAL at 09:20

## 2019-04-02 RX ADMIN — RISPERIDONE 0.5 MILLIGRAM(S): 4 TABLET ORAL at 13:50

## 2019-04-02 RX ADMIN — MEMANTINE HYDROCHLORIDE 5 MILLIGRAM(S): 10 TABLET ORAL at 21:09

## 2019-04-02 RX ADMIN — SIMETHICONE 160 MILLIGRAM(S): 80 TABLET, CHEWABLE ORAL at 21:09

## 2019-04-02 RX ADMIN — SIMETHICONE 160 MILLIGRAM(S): 80 TABLET, CHEWABLE ORAL at 13:50

## 2019-04-02 RX ADMIN — RISPERIDONE 0.5 MILLIGRAM(S): 4 TABLET ORAL at 09:19

## 2019-04-02 RX ADMIN — Medication 25 MILLIGRAM(S): at 21:09

## 2019-04-02 RX ADMIN — AMLODIPINE BESYLATE 5 MILLIGRAM(S): 2.5 TABLET ORAL at 09:19

## 2019-04-02 RX ADMIN — RISPERIDONE 0.5 MILLIGRAM(S): 4 TABLET ORAL at 21:09

## 2019-04-02 RX ADMIN — METFORMIN HYDROCHLORIDE 500 MILLIGRAM(S): 850 TABLET ORAL at 09:19

## 2019-04-02 NOTE — PROGRESS NOTE BEHAVIORAL HEALTH - PROBLEM SELECTOR PLAN 3
1. spoke with his daughter Toyin Sim -[phone] aftercare planning and where the pt will be living 1. spoke with his daughter Toyin Sim [phone] aftercare planning and where the pt will be living as the pt is undocumented and has no funds of his own.

## 2019-04-02 NOTE — PROGRESS NOTE BEHAVIORAL HEALTH - SUMMARY
Patient is a 65 y/o AA M, , has 3 adult children, unemployed, domiciled alone for one week (previously living with wife), with unknown PPhx, denies hx of inpt hospitalizations, denies suicide attempts, denies violence hx, denies legal problems, denies substance use, and PMhx of HTN and DM. Patient presents today brought in by EMS after he was found on the streets reporting that a cult is after him. On evaluation pt reports complex paranoid delusions and associated AH that he is being targeted by a cult/gang and the  of [facility], and that he is unsafe at home and has to give up his house. He is unable to engage in safety planning or care for himself due to his psychiatric symptoms. At this time pt requires inpt hospitalization for safety and stabilization.

## 2019-04-02 NOTE — PROGRESS NOTE BEHAVIORAL HEALTH - NSBHFUPINTERVALHXFT_PSY_A_CORE
Pt denies any suicidal ideation Pt with continued compliance with medication  Pt has yet to be in contact. Pt need to be either living with Pt denies any suicidal ideation Pt with continued compliance with medication  Pt has yet to be in contact. Pt need to be either living withanother relative or needing to go for an adult home as the family indicated that  a nursing home would be too expensive and they will be needing to pay as the pt is un documented.

## 2019-04-03 DIAGNOSIS — D72.1 EOSINOPHILIA: ICD-10-CM

## 2019-04-03 DIAGNOSIS — D69.6 THROMBOCYTOPENIA, UNSPECIFIED: ICD-10-CM

## 2019-04-03 LAB — GLUCOSE BLDC GLUCOMTR-MCNC: 153 MG/DL — HIGH (ref 70–99)

## 2019-04-03 PROCEDURE — 99232 SBSQ HOSP IP/OBS MODERATE 35: CPT

## 2019-04-03 RX ADMIN — RISPERIDONE 0.5 MILLIGRAM(S): 4 TABLET ORAL at 08:50

## 2019-04-03 RX ADMIN — AMLODIPINE BESYLATE 5 MILLIGRAM(S): 2.5 TABLET ORAL at 08:50

## 2019-04-03 RX ADMIN — Medication 25 MILLIGRAM(S): at 20:43

## 2019-04-03 RX ADMIN — MEMANTINE HYDROCHLORIDE 5 MILLIGRAM(S): 10 TABLET ORAL at 20:43

## 2019-04-03 RX ADMIN — SIMETHICONE 160 MILLIGRAM(S): 80 TABLET, CHEWABLE ORAL at 20:43

## 2019-04-03 RX ADMIN — METFORMIN HYDROCHLORIDE 500 MILLIGRAM(S): 850 TABLET ORAL at 08:50

## 2019-04-03 RX ADMIN — RISPERIDONE 0.5 MILLIGRAM(S): 4 TABLET ORAL at 12:30

## 2019-04-03 RX ADMIN — SIMETHICONE 160 MILLIGRAM(S): 80 TABLET, CHEWABLE ORAL at 12:30

## 2019-04-03 RX ADMIN — METFORMIN HYDROCHLORIDE 500 MILLIGRAM(S): 850 TABLET ORAL at 20:43

## 2019-04-03 RX ADMIN — SIMETHICONE 160 MILLIGRAM(S): 80 TABLET, CHEWABLE ORAL at 08:50

## 2019-04-03 RX ADMIN — RISPERIDONE 0.5 MILLIGRAM(S): 4 TABLET ORAL at 20:43

## 2019-04-03 NOTE — PROGRESS NOTE BEHAVIORAL HEALTH - SUMMARY
Patient is a 65 y/o AA M, , has 3 adult children, unemployed, domiciled alone for one week (previously living with wife), with unknown PPhx, denies hx of inpt hospitalizations, denies suicide attempts, denies violence hx, denies legal problems, denies substance use, and PMhx of HTN and DM. Patient presents today brought in by EMS after he was found on the streets reporting that a cult is after him. On evaluation pt reports complex paranoid delusions and associated AH that he is being targeted by a cult/gang and the  of [facility], and that he is unsafe at home and has to give up his house. He is unable to engage in safety planning or care for himself due to his psychiatric symptoms. At this time pt requires inpt hospitalization for safety and stabilization.
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
## 2019-04-03 NOTE — PROGRESS NOTE BEHAVIORAL HEALTH - PROBLEM SELECTOR PLAN 3
1. spoke with his daughter Toyin Sim -[phone] aftercare planning and where the pt will be living as the pt is undocumented and has no funds of his own.  2. possible need to involve adult protective services

## 2019-04-04 LAB
BASOPHILS # BLD AUTO: 0.02 K/UL — SIGNIFICANT CHANGE UP (ref 0–0.2)
BASOPHILS NFR BLD AUTO: 0.2 % — SIGNIFICANT CHANGE UP (ref 0–2)
EOSINOPHIL # BLD AUTO: 0.64 K/UL — HIGH (ref 0–0.5)
EOSINOPHIL NFR BLD AUTO: 6.7 % — HIGH (ref 0–6)
GLUCOSE BLDC GLUCOMTR-MCNC: 145 MG/DL — HIGH (ref 70–99)
HCT VFR BLD CALC: 41.8 % — SIGNIFICANT CHANGE UP (ref 39–50)
HGB BLD-MCNC: 13.2 G/DL — SIGNIFICANT CHANGE UP (ref 13–17)
IMM GRANULOCYTES NFR BLD AUTO: 0.6 % — SIGNIFICANT CHANGE UP (ref 0–1.5)
LYMPHOCYTES # BLD AUTO: 2.03 K/UL — SIGNIFICANT CHANGE UP (ref 1–3.3)
LYMPHOCYTES # BLD AUTO: 21.2 % — SIGNIFICANT CHANGE UP (ref 13–44)
MCHC RBC-ENTMCNC: 27.2 PG — SIGNIFICANT CHANGE UP (ref 27–34)
MCHC RBC-ENTMCNC: 31.6 GM/DL — LOW (ref 32–36)
MCV RBC AUTO: 86.2 FL — SIGNIFICANT CHANGE UP (ref 80–100)
MONOCYTES # BLD AUTO: 0.86 K/UL — SIGNIFICANT CHANGE UP (ref 0–0.9)
MONOCYTES NFR BLD AUTO: 9 % — SIGNIFICANT CHANGE UP (ref 2–14)
NEUTROPHILS # BLD AUTO: 5.97 K/UL — SIGNIFICANT CHANGE UP (ref 1.8–7.4)
NEUTROPHILS NFR BLD AUTO: 62.3 % — SIGNIFICANT CHANGE UP (ref 43–77)
NRBC # BLD: 0 /100 WBCS — SIGNIFICANT CHANGE UP (ref 0–0)
PLATELET # BLD AUTO: 139 K/UL — LOW (ref 150–400)
PSA FLD-MCNC: 8.96 NG/ML — HIGH (ref 0–4)
RBC # BLD: 4.85 M/UL — SIGNIFICANT CHANGE UP (ref 4.2–5.8)
RBC # FLD: 13.6 % — SIGNIFICANT CHANGE UP (ref 10.3–14.5)
WBC # BLD: 9.58 K/UL — SIGNIFICANT CHANGE UP (ref 3.8–10.5)
WBC # FLD AUTO: 9.58 K/UL — SIGNIFICANT CHANGE UP (ref 3.8–10.5)

## 2019-04-04 PROCEDURE — 99232 SBSQ HOSP IP/OBS MODERATE 35: CPT

## 2019-04-04 RX ADMIN — SIMETHICONE 160 MILLIGRAM(S): 80 TABLET, CHEWABLE ORAL at 09:12

## 2019-04-04 RX ADMIN — RISPERIDONE 0.5 MILLIGRAM(S): 4 TABLET ORAL at 13:32

## 2019-04-04 RX ADMIN — RISPERIDONE 0.5 MILLIGRAM(S): 4 TABLET ORAL at 09:12

## 2019-04-04 RX ADMIN — SIMETHICONE 160 MILLIGRAM(S): 80 TABLET, CHEWABLE ORAL at 13:32

## 2019-04-04 RX ADMIN — Medication 25 MILLIGRAM(S): at 21:40

## 2019-04-04 RX ADMIN — RISPERIDONE 0.5 MILLIGRAM(S): 4 TABLET ORAL at 21:38

## 2019-04-04 RX ADMIN — METFORMIN HYDROCHLORIDE 500 MILLIGRAM(S): 850 TABLET ORAL at 21:38

## 2019-04-04 RX ADMIN — METFORMIN HYDROCHLORIDE 500 MILLIGRAM(S): 850 TABLET ORAL at 09:12

## 2019-04-04 RX ADMIN — AMLODIPINE BESYLATE 5 MILLIGRAM(S): 2.5 TABLET ORAL at 09:12

## 2019-04-04 RX ADMIN — SIMETHICONE 160 MILLIGRAM(S): 80 TABLET, CHEWABLE ORAL at 21:38

## 2019-04-04 RX ADMIN — MEMANTINE HYDROCHLORIDE 5 MILLIGRAM(S): 10 TABLET ORAL at 21:40

## 2019-04-04 NOTE — PROGRESS NOTE BEHAVIORAL HEALTH - NSBHFUPINTERVALHXFT_PSY_A_CORE
Pt with improving eosinophilia  and improving thrombocytopenia. Pt with improving eosinophilia  and improving thrombocytopenia.  PSA not back yet.  Pt with continued need for housing as he is with no funds and is undocumented.  Pt has been here more than 35 days.  Daughter who is a doctor claimed last week she would be in but has yet to appear.

## 2019-04-04 NOTE — PROGRESS NOTE BEHAVIORAL HEALTH - SUMMARY
Patient is a 65 y/o AA M, , has 3 adult children, unemployed, domiciled alone for one week (previously living with wife), with unknown PPhx, denies hx of inpt hospitalizations, denies suicide attempts, denies violence hx, denies legal problems, denies substance use, and PMhx of HTN and DM. Patient presents today brought in by EMS after he was found on the streets reporting that a cult is after him. On evaluation pt reports complex paranoid delusions and associated AH that he is being targeted by a cult/gang and the  of [facility], and that he is unsafe at home and has to give up his house. He is unable to engage in safety planning or care for himself due to his psychiatric symptoms. At this time pt requires inpt hospitalization for safety and stabilization.

## 2019-04-04 NOTE — PROGRESS NOTE BEHAVIORAL HEALTH - NSBHCHARTREVIEWVS_PSY_A_CORE FT
Vital Signs Last 24 Hrs  T(C): 37.1 (04 Apr 2019 07:57), Max: 37.1 (04 Apr 2019 07:57)  T(F): 98.8 (04 Apr 2019 07:57), Max: 98.8 (04 Apr 2019 07:57)  HR: --  BP: --150/78  BP(mean): --  RR: --  SpO2: --

## 2019-04-05 LAB — GLUCOSE BLDC GLUCOMTR-MCNC: 166 MG/DL — HIGH (ref 70–99)

## 2019-04-05 RX ADMIN — RISPERIDONE 0.5 MILLIGRAM(S): 4 TABLET ORAL at 09:06

## 2019-04-05 RX ADMIN — RISPERIDONE 0.5 MILLIGRAM(S): 4 TABLET ORAL at 12:23

## 2019-04-05 RX ADMIN — RISPERIDONE 0.5 MILLIGRAM(S): 4 TABLET ORAL at 20:34

## 2019-04-05 RX ADMIN — SIMETHICONE 160 MILLIGRAM(S): 80 TABLET, CHEWABLE ORAL at 20:34

## 2019-04-05 RX ADMIN — METFORMIN HYDROCHLORIDE 500 MILLIGRAM(S): 850 TABLET ORAL at 09:06

## 2019-04-05 RX ADMIN — METFORMIN HYDROCHLORIDE 500 MILLIGRAM(S): 850 TABLET ORAL at 20:34

## 2019-04-05 RX ADMIN — SIMETHICONE 160 MILLIGRAM(S): 80 TABLET, CHEWABLE ORAL at 12:23

## 2019-04-05 RX ADMIN — Medication 25 MILLIGRAM(S): at 20:34

## 2019-04-05 RX ADMIN — AMLODIPINE BESYLATE 5 MILLIGRAM(S): 2.5 TABLET ORAL at 09:06

## 2019-04-05 RX ADMIN — MEMANTINE HYDROCHLORIDE 5 MILLIGRAM(S): 10 TABLET ORAL at 20:34

## 2019-04-05 RX ADMIN — SIMETHICONE 160 MILLIGRAM(S): 80 TABLET, CHEWABLE ORAL at 09:06

## 2019-04-05 NOTE — CHART NOTE - NSCHARTNOTEFT_GEN_A_CORE
Patient's wife called stating she absolutely can not assist patient with housing.  She states she lives with her daughter, mother and granddaughter and there is not room.  She stated even if patient was living on the street, she could not help him.  She also stated that the daughter called asking the same of her.  Called and spoke with daughter who states she was under the impression that Debbie found something for the patient.  I apologized if she misunderstood but Debbie stated that she didn't think it would be a problem placing patient at [facility] if there was an opening but there is not.   I stated most AH are around $2500 and she stated she didn't think that would be possible but she would discuss with her .  I explained that patient wants to go home.  Daughter is going to try and have the lights, electric and water turned on and could hire an aide to assist him temporarily until they can work something out.  She will call back on Monday.

## 2019-04-05 NOTE — PROGRESS NOTE BEHAVIORAL HEALTH - PROBLEM SELECTOR PROBLEM 4
Flatulence
Thrombocytopenia
Flatulence
DM (diabetes mellitus)
Flatulence
Thrombocytopenia
Flatulence
Eosinophilia

## 2019-04-05 NOTE — PROGRESS NOTE BEHAVIORAL HEALTH - PROBLEM SELECTOR PROBLEM 3
DM (diabetes mellitus)
Eosinophilia
HTN (hypertension)
HTN (hypertension)
DM (diabetes mellitus)
HTN (hypertension)
HTN (hypertension)
Aftercare
Aftercare
HTN (hypertension)
HTN (hypertension)
Eosinophilia
HTN (hypertension)
Insomnia, unspecified type
Aftercare
HTN (hypertension)
DM (diabetes mellitus)
Aftercare

## 2019-04-05 NOTE — PROGRESS NOTE BEHAVIORAL HEALTH - SUMMARY
Patient is a 65 y/o AA M, , has 3 adult children, unemployed, domiciled alone for one week (previously living with wife), with unknown PPhx, denies hx of inpt hospitalizations, denies suicide attempts, denies violence hx, denies legal problems, denies substance use, and PMhx of HTN and DM. Patient presents today brought in by EMS after he was found on the streets reporting that a cult is after him. On evaluation pt reports complex paranoid delusions and associated AH that he is being targeted by a cult/gang and the  of [facility], and that he is unsafe at home and has to give up his house. He is unable to engage in safety planning or care for himself due to his psychiatric symptoms. At this time pt requires inpt hospitalization for safety and stabilization.

## 2019-04-06 LAB — GLUCOSE BLDC GLUCOMTR-MCNC: 145 MG/DL — HIGH (ref 70–99)

## 2019-04-06 RX ADMIN — METFORMIN HYDROCHLORIDE 500 MILLIGRAM(S): 850 TABLET ORAL at 09:20

## 2019-04-06 RX ADMIN — RISPERIDONE 0.5 MILLIGRAM(S): 4 TABLET ORAL at 09:19

## 2019-04-06 RX ADMIN — MEMANTINE HYDROCHLORIDE 5 MILLIGRAM(S): 10 TABLET ORAL at 21:02

## 2019-04-06 RX ADMIN — Medication 25 MILLIGRAM(S): at 21:02

## 2019-04-06 RX ADMIN — SIMETHICONE 160 MILLIGRAM(S): 80 TABLET, CHEWABLE ORAL at 13:45

## 2019-04-06 RX ADMIN — AMLODIPINE BESYLATE 5 MILLIGRAM(S): 2.5 TABLET ORAL at 09:20

## 2019-04-06 RX ADMIN — RISPERIDONE 0.5 MILLIGRAM(S): 4 TABLET ORAL at 21:02

## 2019-04-06 RX ADMIN — SIMETHICONE 160 MILLIGRAM(S): 80 TABLET, CHEWABLE ORAL at 21:02

## 2019-04-06 RX ADMIN — METFORMIN HYDROCHLORIDE 500 MILLIGRAM(S): 850 TABLET ORAL at 21:02

## 2019-04-06 RX ADMIN — RISPERIDONE 0.5 MILLIGRAM(S): 4 TABLET ORAL at 13:45

## 2019-04-06 RX ADMIN — SIMETHICONE 160 MILLIGRAM(S): 80 TABLET, CHEWABLE ORAL at 09:19

## 2019-04-07 LAB — GLUCOSE BLDC GLUCOMTR-MCNC: 174 MG/DL — HIGH (ref 70–99)

## 2019-04-07 RX ADMIN — METFORMIN HYDROCHLORIDE 500 MILLIGRAM(S): 850 TABLET ORAL at 08:48

## 2019-04-07 RX ADMIN — RISPERIDONE 0.5 MILLIGRAM(S): 4 TABLET ORAL at 21:20

## 2019-04-07 RX ADMIN — Medication 25 MILLIGRAM(S): at 21:20

## 2019-04-07 RX ADMIN — METFORMIN HYDROCHLORIDE 500 MILLIGRAM(S): 850 TABLET ORAL at 21:20

## 2019-04-07 RX ADMIN — SIMETHICONE 160 MILLIGRAM(S): 80 TABLET, CHEWABLE ORAL at 13:43

## 2019-04-07 RX ADMIN — AMLODIPINE BESYLATE 5 MILLIGRAM(S): 2.5 TABLET ORAL at 08:48

## 2019-04-07 RX ADMIN — MEMANTINE HYDROCHLORIDE 5 MILLIGRAM(S): 10 TABLET ORAL at 21:20

## 2019-04-07 RX ADMIN — SIMETHICONE 160 MILLIGRAM(S): 80 TABLET, CHEWABLE ORAL at 21:20

## 2019-04-07 RX ADMIN — SIMETHICONE 160 MILLIGRAM(S): 80 TABLET, CHEWABLE ORAL at 08:48

## 2019-04-07 RX ADMIN — RISPERIDONE 0.5 MILLIGRAM(S): 4 TABLET ORAL at 13:43

## 2019-04-07 RX ADMIN — RISPERIDONE 0.5 MILLIGRAM(S): 4 TABLET ORAL at 08:48

## 2019-04-08 LAB — GLUCOSE BLDC GLUCOMTR-MCNC: 159 MG/DL — HIGH (ref 70–99)

## 2019-04-08 PROCEDURE — 99232 SBSQ HOSP IP/OBS MODERATE 35: CPT

## 2019-04-08 RX ADMIN — SIMETHICONE 160 MILLIGRAM(S): 80 TABLET, CHEWABLE ORAL at 09:17

## 2019-04-08 RX ADMIN — METFORMIN HYDROCHLORIDE 500 MILLIGRAM(S): 850 TABLET ORAL at 09:17

## 2019-04-08 RX ADMIN — RISPERIDONE 0.5 MILLIGRAM(S): 4 TABLET ORAL at 13:42

## 2019-04-08 RX ADMIN — AMLODIPINE BESYLATE 5 MILLIGRAM(S): 2.5 TABLET ORAL at 09:17

## 2019-04-08 RX ADMIN — METFORMIN HYDROCHLORIDE 500 MILLIGRAM(S): 850 TABLET ORAL at 20:52

## 2019-04-08 RX ADMIN — SIMETHICONE 160 MILLIGRAM(S): 80 TABLET, CHEWABLE ORAL at 20:54

## 2019-04-08 RX ADMIN — MEMANTINE HYDROCHLORIDE 5 MILLIGRAM(S): 10 TABLET ORAL at 20:52

## 2019-04-08 RX ADMIN — SIMETHICONE 160 MILLIGRAM(S): 80 TABLET, CHEWABLE ORAL at 13:43

## 2019-04-08 RX ADMIN — RISPERIDONE 0.5 MILLIGRAM(S): 4 TABLET ORAL at 20:52

## 2019-04-08 RX ADMIN — RISPERIDONE 0.5 MILLIGRAM(S): 4 TABLET ORAL at 09:17

## 2019-04-08 RX ADMIN — Medication 25 MILLIGRAM(S): at 20:55

## 2019-04-08 NOTE — PROGRESS NOTE BEHAVIORAL HEALTH - SUMMARY
Patient is a 63 y/o AA M, , has 3 adult children, unemployed, domiciled alone for one week (previously living with wife), with unknown PPhx, denies hx of inpt hospitalizations, denies suicide attempts, denies violence hx, denies legal problems, denies substance use, and PMhx of HTN and DM. Patient presents today brought in by EMS after he was found on the streets reporting that a cult is after him. On evaluation pt reports complex paranoid delusions and associated AH that he is being targeted by a cult/gang and the  of [facility], and that he is unsafe at home and has to give up his house. He is unable to engage in safety planning or care for himself due to his psychiatric symptoms. At this time pt requires inpt hospitalization for safety and stabilization.

## 2019-04-08 NOTE — PROGRESS NOTE BEHAVIORAL HEALTH - NSBHCHARTREVIEWVS_PSY_A_CORE FT
Vital Signs Last 24 Hrs  T(C): 36.9 (08 Apr 2019 08:41), Max: 36.9 (08 Apr 2019 08:41)  T(F): 98.4 (08 Apr 2019 08:41), Max: 98.4 (08 Apr 2019 08:41)  HR: --  BP: --  BP(mean): --  RR: 14 (08 Apr 2019 08:41) (14 - 14)  SpO2: 100% (08 Apr 2019 08:41) (100% - 100%)

## 2019-04-08 NOTE — CHART NOTE - NSCHARTNOTEFT_GEN_A_CORE
4/8/2019 2:47 PM (ET) Debbie Pike: Case discussed with Dr. Suárez and in team mtg.  [facility] while worker was off on 4/5.    Met with pt who is refusing to go to an Adult Home and continues to state that he wants to go back to his home and try to work again.  There is still no heat, electric or water in the home.   Spoke to pt's dtr, Toyin Sim, at length today.  She states she spoke to her father last night and he also told her he does not want to go to an Adult Home.  He reportedly told his dtr. he would return to [facility] to be with his extended family.  Dtr. is working on getting him a passport which he will need to fly to [facility].  Dtr. states she is flying to [place] on Friday and will come to  pt and take him to a motel.  She will then try to come up with a plan- either bring pt back to [facility] to stay with her temporarily or put him up in a motel until his passport comes and he is able to fly to [facility].  She will also explore Adult Home placement in [place] or by her in [place] if pt allows this.  Worker will assist with aftercare depending on where pt will be upon d/c.  Dr. Suárez advised of above.

## 2019-04-08 NOTE — PROGRESS NOTE BEHAVIORAL HEALTH - NSBHCHARTREVIEWLAB_PSY_A_CORE FT
Comprehensive Metabolic Panel (02.24.19 @ 23:11)    Sodium, Serum: 142 mmol/L    Potassium, Serum: 3.5: Specimen slightly hemolyzed mmol/L    Chloride, Serum: 109 mmol/L    Carbon Dioxide, Serum: 27 mmol/L    Anion Gap, Serum: 6 mmol/L    Blood Urea Nitrogen, Serum: 16 mg/dL    Creatinine, Serum: 0.70 mg/dL    Glucose, Serum: 95 mg/dL    Calcium, Total Serum: 8.3 mg/dL    Protein Total, Serum: 6.9 gm/dL    Albumin, Serum: 3.3 g/dL    Bilirubin Total, Serum: 0.4 mg/dL    Alkaline Phosphatase, Serum: 67 U/L    Aspartate Aminotransferase (AST/SGOT): 41 U/L    Alanine Aminotransferase (ALT/SGPT): 35 U/L    eGFR if Non : 98: Interpretative comment  The units for eGFR are mL/min/1.73M2 (normalized body surface area). The  eGFR is calculated from a serum creatinine using the CKD-EPI equation.  Other variables required for calculation are race, age and sex. Among  patients with chronic kidney disease (CKD), the eGFR is useful in  determining the stage of disease according to KDOQI CKD classification.  All eGFR results are reported numerically with the following  interpretation.          GFR                    With                 Without     (ml/min/1.73 m2)    Kidney Damage       Kidney Damage        >= 90                    Stage 1                     Normal        60-89                    Stage 2                     Decreased GFR        30-59     Stage 3                     Stage 3        15-29                    Stage 4                     Stage 4        < 15                      Stage 5                     Stage 5  Each stage of CKD assumes that the associated GFR level has been in  effect for at least 3 months. Determination of stages one and two (with  eGFR > 59 ml/min/m2) requires estimation of kidney damage for at least 3  months as defined by structural or functional abnormalities.  Limitations: All estimates of GFR will be less accurate for patients at  extremes of muscle mass (including but not limited to frail elderly,  critically ill, or cancer patients), those with unusual diets, and those  with conditions associated with reduced secretion or extrarenal  elimination of creatinine. The eGFR equation is not recommended for use  in patients with unstable creatinine levels. mL/min/1.73M2    eGFR if African American: 114 mL/min/1.73M2
DSRS score of 21 moderate
Complete Blood Count + Automated Diff in AM (03.20.19 @ 07:35)    WBC Count: 8.30 K/uL    RBC Count: 4.70 M/uL    Hemoglobin: 13.3 g/dL    Hematocrit: 41.8 %    Mean Cell Volume: 88.9 fl    Mean Cell Hemoglobin: 28.3 pg    Mean Cell Hemoglobin Conc: 31.8 gm/dL    Red Cell Distrib Width: 14.4 %    Platelet Count - Automated: 128 K/uL    Auto Neutrophil #: 3.74 K/uL    Auto Lymphocyte #: 1.83 K/uL    Auto Monocyte #: 0.66 K/uL    Auto Eosinophil #: 2.08 K/uL    Auto Basophil #: 0.00 K/uL    Auto Neutrophil %: 45.0: Differential percentages must be correlated with absolute numbers for  clinical significance. %    Auto Lymphocyte %: 22.0 %    Auto Monocyte %: 8.0 %    Auto Eosinophil %: 25.0 %    Auto Basophil %: 0.0 %    Nucleated RBC: N/A: Manual NRBC performed. /100 WBCs
Comprehensive Metabolic Panel (02.24.19 @ 23:11)    Sodium, Serum: 142 mmol/L    Potassium, Serum: 3.5: Specimen slightly hemolyzed mmol/L    Chloride, Serum: 109 mmol/L    Carbon Dioxide, Serum: 27 mmol/L    Anion Gap, Serum: 6 mmol/L    Blood Urea Nitrogen, Serum: 16 mg/dL    Creatinine, Serum: 0.70 mg/dL    Glucose, Serum: 95 mg/dL    Calcium, Total Serum: 8.3 mg/dL    Protein Total, Serum: 6.9 gm/dL    Albumin, Serum: 3.3 g/dL    Bilirubin Total, Serum: 0.4 mg/dL    Alkaline Phosphatase, Serum: 67 U/L    Aspartate Aminotransferase (AST/SGOT): 41 U/L    Alanine Aminotransferase (ALT/SGPT): 35 U/L    eGFR if Non : 98: Interpretative comment  The units for eGFR are mL/min/1.73M2 (normalized body surface area). The  eGFR is calculated from a serum creatinine using the CKD-EPI equation.  Other variables required for calculation are race, age and sex. Among  patients with chronic kidney disease (CKD), the eGFR is useful in  determining the stage of disease according to KDOQI CKD classification.  All eGFR results are reported numerically with the following  interpretation.          GFR                    With                 Without     (ml/min/1.73 m2)    Kidney Damage       Kidney Damage        >= 90                    Stage 1                     Normal        60-89                    Stage 2                     Decreased GFR        30-59     Stage 3                     Stage 3        15-29                    Stage 4                     Stage 4        < 15                      Stage 5                     Stage 5  Each stage of CKD assumes that the associated GFR level has been in  effect for at least 3 months. Determination of stages one and two (with  eGFR > 59 ml/min/m2) requires estimation of kidney damage for at least 3  months as defined by structural or functional abnormalities.  Limitations: All estimates of GFR will be less accurate for patients at  extremes of muscle mass (including but not limited to frail elderly,  critically ill, or cancer patients), those with unusual diets, and those  with conditions associated with reduced secretion or extrarenal  elimination of creatinine. The eGFR equation is not recommended for use  in patients with unstable creatinine levels. mL/min/1.73M2    eGFR if African American: 114 mL/min/1.73M2
DSRS score of 21 moderate
Lipid Profile in AM (03.08.19 @ 08:15)    Total Cholesterol/HDL Ratio Measurement: 2.4 RATIO    Cholesterol, Serum: 212 mg/dL    Triglycerides, Serum: 36 mg/dL    HDL Cholesterol, Serum: 90: HDL Levels >/= 60 mg/dL are considered beneficial and a "negative" risk  factor.  Effective 08/15/2018: New reference range and interpretive comment. mg/dL    Direct LDL: 115: LDL Cholesterol (mg/dL) --- Interpretive Comment (for adults 18 and over)  Optimal LDL Level may vary based on clinical situation  Below 70                   Ideal for people at very high risk of heart  disease  Below 100                  Ideal for people at risk of heart disease  100 - 129                    [place]  130 - 159                    Borderline high  160 - 189                    High  190 and Above           Very high mg/dL
Lipid Profile in AM (03.08.19 @ 08:15)    Total Cholesterol/HDL Ratio Measurement: 2.4 RATIO    Cholesterol, Serum: 212 mg/dL    Triglycerides, Serum: 36 mg/dL    HDL Cholesterol, Serum: 90: HDL Levels >/= 60 mg/dL are considered beneficial and a "negative" risk  factor.  Effective 08/15/2018: New reference range and interpretive comment. mg/dL    Direct LDL: 115: LDL Cholesterol (mg/dL) --- Interpretive Comment (for adults 18 and over)  Optimal LDL Level may vary based on clinical situation  Below 70                   Ideal for people at very high risk of heart  disease  Below 100                  Ideal for people at risk of heart disease  100 - 129                    [place]  130 - 159                    Borderline high  160 - 189                    High  190 and Above           Very high mg/dL
Mg 2.1, folate 16.2, vit B12 390, vit D  70.
POCT  Blood Glucose (03.26.19 @ 08:07)    POCT Blood Glucose.: 132 mg/dL  Manual Differential (03.20.19 @ 07:35)    Red Cell Morphology: Normal    Platelet Morphology: Normal    Manual Smear Verification: Performed    Nucleated RBC: 0 /100
POCT  Blood Glucose (03.26.19 @ 08:07)    POCT Blood Glucose.: 132 mg/dL  Manual Differential (03.20.19 @ 07:35)    Red Cell Morphology: Normal    Platelet Morphology: Normal    Manual Smear Verification: Performed    Nucleated RBC: 0 /100
Comprehensive Metabolic Panel (02.24.19 @ 23:11)    Sodium, Serum: 142 mmol/L    Potassium, Serum: 3.5: Specimen slightly hemolyzed mmol/L    Chloride, Serum: 109 mmol/L    Carbon Dioxide, Serum: 27 mmol/L    Anion Gap, Serum: 6 mmol/L    Blood Urea Nitrogen, Serum: 16 mg/dL    Creatinine, Serum: 0.70 mg/dL    Glucose, Serum: 95 mg/dL    Calcium, Total Serum: 8.3 mg/dL    Protein Total, Serum: 6.9 gm/dL    Albumin, Serum: 3.3 g/dL    Bilirubin Total, Serum: 0.4 mg/dL    Alkaline Phosphatase, Serum: 67 U/L    Aspartate Aminotransferase (AST/SGOT): 41 U/L    Alanine Aminotransferase (ALT/SGPT): 35 U/L    eGFR if Non : 98: Interpretative comment  The units for eGFR are mL/min/1.73M2 (normalized body surface area). The  eGFR is calculated from a serum creatinine using the CKD-EPI equation.  Other variables required for calculation are race, age and sex. Among  patients with chronic kidney disease (CKD), the eGFR is useful in  determining the stage of disease according to KDOQI CKD classification.  All eGFR results are reported numerically with the following  interpretation.          GFR                    With                 Without     (ml/min/1.73 m2)    Kidney Damage       Kidney Damage        >= 90                    Stage 1                     Normal        60-89                    Stage 2                     Decreased GFR        30-59     Stage 3                     Stage 3        15-29                    Stage 4                     Stage 4        < 15                      Stage 5                     Stage 5  Each stage of CKD assumes that the associated GFR level has been in  effect for at least 3 months. Determination of stages one and two (with  eGFR > 59 ml/min/m2) requires estimation of kidney damage for at least 3  months as defined by structural or functional abnormalities.  Limitations: All estimates of GFR will be less accurate for patients at  extremes of muscle mass (including but not limited to frail elderly,  critically ill, or cancer patients), those with unusual diets, and those  with conditions associated with reduced secretion or extrarenal  elimination of creatinine. The eGFR equation is not recommended for use  in patients with unstable creatinine levels. mL/min/1.73M2    eGFR if African American: 114 mL/min/1.73M2
DSRS score of 21 moderate
Complete Blood Count + Automated Diff in AM (03.20.19 @ 07:35)    WBC Count: 8.30 K/uL    RBC Count: 4.70 M/uL    Hemoglobin: 13.3 g/dL    Hematocrit: 41.8 %    Mean Cell Volume: 88.9 fl    Mean Cell Hemoglobin: 28.3 pg    Mean Cell Hemoglobin Conc: 31.8 gm/dL    Red Cell Distrib Width: 14.4 %    Platelet Count - Automated: 128 K/uL    Auto Neutrophil #: 3.74 K/uL    Auto Lymphocyte #: 1.83 K/uL    Auto Monocyte #: 0.66 K/uL    Auto Eosinophil #: 2.08 K/uL    Auto Basophil #: 0.00 K/uL    Auto Neutrophil %: 45.0: Differential percentages must be correlated with absolute numbers for  clinical significance. %    Auto Lymphocyte %: 22.0 %    Auto Monocyte %: 8.0 %    Auto Eosinophil %: 25.0 %    Auto Basophil %: 0.0 %    Nucleated RBC: N/A: Manual NRBC performed. /100 WBCs
Thyroid Stimulating Hormone, Serum: 1.49 uU/mL (03.08.19 @ 08:15)
Thyroid Stimulating Hormone, Serum: 1.49 uU/mL (03.08.19 @ 08:15)
DSRS score of 21 moderate
Lipid Profile in AM (03.08.19 @ 08:15)    Total Cholesterol/HDL Ratio Measurement: 2.4 RATIO    Cholesterol, Serum: 212 mg/dL    Triglycerides, Serum: 36 mg/dL    HDL Cholesterol, Serum: 90: HDL Levels >/= 60 mg/dL are considered beneficial and a "negative" risk  factor.  Effective 08/15/2018: New reference range and interpretive comment. mg/dL    Direct LDL: 115: LDL Cholesterol (mg/dL) --- Interpretive Comment (for adults 18 and over)  Optimal LDL Level may vary based on clinical situation  Below 70                   Ideal for people at very high risk of heart  disease  Below 100                  Ideal for people at risk of heart disease  100 - 129                    [place]  130 - 159                    Borderline high  160 - 189                    High  190 and Above           Very high mg/dL
no new labs
no new lab

## 2019-04-08 NOTE — PROGRESS NOTE BEHAVIORAL HEALTH - NSBHFUPINTERVALHXFT_PSY_A_CORE
Pt has had CT scan of the head on 2/25/2019 with no acute hemorrhage or any mass effects .  Pt still with belief that people are still interested in being after him but not as intense . Pt with PSA of 8.96 , eosinophilia and with decreased platelets, .  In light of the past hx of no psych hx , yet still with lingering paranoid delusion will get mri of the head and of the pelvis.    Pt still with want to return to his home inspite of family attempting to find him another place to live.  The family is inteding to pay what is owed to reopen the electricity and the gas in the home.

## 2019-04-09 LAB — GLUCOSE BLDC GLUCOMTR-MCNC: 157 MG/DL — HIGH (ref 70–99)

## 2019-04-09 RX ADMIN — SIMETHICONE 160 MILLIGRAM(S): 80 TABLET, CHEWABLE ORAL at 13:53

## 2019-04-09 RX ADMIN — METFORMIN HYDROCHLORIDE 500 MILLIGRAM(S): 850 TABLET ORAL at 09:12

## 2019-04-09 RX ADMIN — Medication 25 MILLIGRAM(S): at 21:04

## 2019-04-09 RX ADMIN — MEMANTINE HYDROCHLORIDE 5 MILLIGRAM(S): 10 TABLET ORAL at 21:02

## 2019-04-09 RX ADMIN — METFORMIN HYDROCHLORIDE 500 MILLIGRAM(S): 850 TABLET ORAL at 21:02

## 2019-04-09 RX ADMIN — RISPERIDONE 0.5 MILLIGRAM(S): 4 TABLET ORAL at 09:12

## 2019-04-09 RX ADMIN — RISPERIDONE 0.5 MILLIGRAM(S): 4 TABLET ORAL at 13:53

## 2019-04-09 RX ADMIN — RISPERIDONE 0.5 MILLIGRAM(S): 4 TABLET ORAL at 21:01

## 2019-04-09 RX ADMIN — SIMETHICONE 160 MILLIGRAM(S): 80 TABLET, CHEWABLE ORAL at 09:12

## 2019-04-09 RX ADMIN — AMLODIPINE BESYLATE 5 MILLIGRAM(S): 2.5 TABLET ORAL at 09:12

## 2019-04-09 NOTE — PROGRESS NOTE BEHAVIORAL HEALTH - NSBHCHARTREVIEWVS_PSY_A_CORE FT
Vital Signs Last 24 Hrs  T(C): 37.1 (09 Apr 2019 08:26), Max: 37.1 (09 Apr 2019 08:26)  T(F): 98.7 (09 Apr 2019 08:26), Max: 98.7 (09 Apr 2019 08:26)  HR: --  BP: --  BP(mean): --  RR: 16 (09 Apr 2019 08:26) (16 - 16)  SpO2: 100% (09 Apr 2019 08:26) (100% - 100%)

## 2019-04-09 NOTE — PROGRESS NOTE BEHAVIORAL HEALTH - SUMMARY
Patient is a 63 y/o AA M, , has 3 adult children, unemployed, domiciled alone for one week (previously living with wife), with unknown PPhx, denies hx of inpt hospitalizations, denies suicide attempts, denies violence hx, denies legal problems, denies substance use, and PMhx of HTN and DM. Patient presents today brought in by EMS after he was found on the streets reporting that a cult is after him. On evaluation pt reports complex paranoid delusions and associated AH that he is being targeted by a cult/gang and the  of [facility], and that he is unsafe at home and has to give up his house. He is unable to engage in safety planning or care for himself due to his psychiatric symptoms. At this time pt requires inpt hospitalization for safety and stabilization.

## 2019-04-10 LAB — GLUCOSE BLDC GLUCOMTR-MCNC: 165 MG/DL — HIGH (ref 70–99)

## 2019-04-10 RX ORDER — SIMETHICONE 80 MG/1
1 TABLET, CHEWABLE ORAL
Qty: 45 | Refills: 0 | OUTPATIENT
Start: 2019-04-10 | End: 2019-04-24

## 2019-04-10 RX ORDER — AMLODIPINE BESYLATE 2.5 MG/1
1 TABLET ORAL
Qty: 15 | Refills: 0 | OUTPATIENT
Start: 2019-04-10 | End: 2019-04-24

## 2019-04-10 RX ORDER — MEMANTINE HYDROCHLORIDE 10 MG/1
1 TABLET ORAL
Qty: 15 | Refills: 0
Start: 2019-04-10 | End: 2019-04-25

## 2019-04-10 RX ORDER — RISPERIDONE 4 MG/1
1 TABLET ORAL
Qty: 45 | Refills: 0
Start: 2019-04-10 | End: 2019-04-25

## 2019-04-10 RX ORDER — SIMETHICONE 80 MG/1
1 TABLET, CHEWABLE ORAL
Qty: 45 | Refills: 0
Start: 2019-04-10 | End: 2019-04-25

## 2019-04-10 RX ORDER — TRAZODONE HCL 50 MG
1 TABLET ORAL
Qty: 15 | Refills: 0 | OUTPATIENT
Start: 2019-04-10 | End: 2019-04-24

## 2019-04-10 RX ORDER — AMLODIPINE BESYLATE 2.5 MG/1
0 TABLET ORAL
Qty: 0 | Refills: 0 | COMMUNITY

## 2019-04-10 RX ORDER — MEMANTINE HYDROCHLORIDE 10 MG/1
1 TABLET ORAL
Qty: 15 | Refills: 0 | OUTPATIENT
Start: 2019-04-10 | End: 2019-04-24

## 2019-04-10 RX ORDER — AMLODIPINE BESYLATE 2.5 MG/1
1 TABLET ORAL
Qty: 15 | Refills: 0
Start: 2019-04-10 | End: 2019-04-25

## 2019-04-10 RX ORDER — RISPERIDONE 4 MG/1
1 TABLET ORAL
Qty: 45 | Refills: 0 | OUTPATIENT
Start: 2019-04-10 | End: 2019-04-24

## 2019-04-10 RX ORDER — TRAZODONE HCL 50 MG
1 TABLET ORAL
Qty: 15 | Refills: 0
Start: 2019-04-10 | End: 2019-04-25

## 2019-04-10 RX ADMIN — RISPERIDONE 0.5 MILLIGRAM(S): 4 TABLET ORAL at 14:23

## 2019-04-10 RX ADMIN — Medication 25 MILLIGRAM(S): at 20:31

## 2019-04-10 RX ADMIN — SIMETHICONE 160 MILLIGRAM(S): 80 TABLET, CHEWABLE ORAL at 09:10

## 2019-04-10 RX ADMIN — METFORMIN HYDROCHLORIDE 500 MILLIGRAM(S): 850 TABLET ORAL at 09:10

## 2019-04-10 RX ADMIN — SIMETHICONE 160 MILLIGRAM(S): 80 TABLET, CHEWABLE ORAL at 14:23

## 2019-04-10 RX ADMIN — SIMETHICONE 160 MILLIGRAM(S): 80 TABLET, CHEWABLE ORAL at 20:28

## 2019-04-10 RX ADMIN — AMLODIPINE BESYLATE 5 MILLIGRAM(S): 2.5 TABLET ORAL at 09:10

## 2019-04-10 RX ADMIN — RISPERIDONE 0.5 MILLIGRAM(S): 4 TABLET ORAL at 09:10

## 2019-04-10 RX ADMIN — RISPERIDONE 0.5 MILLIGRAM(S): 4 TABLET ORAL at 20:31

## 2019-04-10 RX ADMIN — MEMANTINE HYDROCHLORIDE 5 MILLIGRAM(S): 10 TABLET ORAL at 20:28

## 2019-04-10 NOTE — DISCHARGE NOTE BEHAVIORAL HEALTH - NSBHDCCONDITIONFT_PSY_A_CORE
MEDICATIONS  (STANDING):  amLODIPine   Tablet 5 milliGRAM(s) Oral daily  memantine 5 milliGRAM(s) Oral at bedtime  risperiDONE   Tablet 0.5 milliGRAM(s) Oral three times a day  simethicone 160 milliGRAM(s) Chew three times a day  traZODone 25 milliGRAM(s) Oral at bedtime
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
## 2019-04-10 NOTE — DISCHARGE NOTE BEHAVIORAL HEALTH - MEDICATION SUMMARY - MEDICATIONS TO TAKE
I will START or STAY ON the medications listed below when I get home from the hospital:    traZODone 50 mg oral tablet  -- 1  by mouth once a day (at bedtime)   -- Indication: For Insomnia, unspecified type    risperiDONE 0.5 mg oral tablet  -- 1 tab(s) by mouth 3 times a day  -- Indication: For Schizoaffective disorder    amLODIPine 5 mg oral tablet  -- 1 tab(s) by mouth once a day  -- Indication: For cardiovascular    memantine 5 mg oral tablet  -- 1 tab(s) by mouth once a day (at bedtime)  -- Indication: For Dementia    simethicone 80 mg oral tablet, chewable  -- 1 tab(s) by mouth 3 times a day   -- Indication: For GI agent

## 2019-04-10 NOTE — PROGRESS NOTE BEHAVIORAL HEALTH - ESTIMATED INTELLIGENCE
Average

## 2019-04-10 NOTE — PROGRESS NOTE BEHAVIORAL HEALTH - NSBHADMITIPDSM_PSY_A_CORE
see above for Axis I, II, III

## 2019-04-10 NOTE — DISCHARGE NOTE BEHAVIORAL HEALTH - NSBHDCMEDICALFT_PSY_A_CORE
Pt with positive PSA of Prostate Ca Screen, PSA Total: 8.96: Method: Roche Electro chemiluminescence Immuno Assay  Values obtained with different assay methods or kits cannot be  used interchangeably.  Results cannot be interpreted as absolute evidence of the presence  or absence of malignant disease. ng/mL (04.04.19 @ 07:49)

## 2019-04-10 NOTE — DISCHARGE NOTE BEHAVIORAL HEALTH - NSBHDCHOUSINGFT_PSY_A_CORE
Patient's daughter is travelling to [place] to  her father and arranging for travel back to [facility] [place].  Once back to his country, daughter will arrange for patient to get mental health follow up treatment.

## 2019-04-10 NOTE — PROGRESS NOTE BEHAVIORAL HEALTH - NSBHFUPINTERVALCCFT_PSY_A_CORE
"I'm still feeing alright"
"I feel better"
"I feel much more safe right now"
"I feel ok"
"I feel ready to go home soon."
"I feel safe here "
"I was supposed to get a test"
"I would like to go home soon."
"I'm comfortable being here but when I get out I need to go to the consulate   to see if people are still after me."
"I'm not as anxious about the future"
"I'm still safer here"
I am very tired today"
I have a place to live. "
I have a place to live. "
"I have my own life to life amd I will be fine going home."
"I feel ok"
"I'm in trouble if I leave here
"I feel alright while I am here,"
"I think I can handle going home."
"I feel much safer here."
"I realize I have a life to live"
"I would like to stay here I feel well here"
:I feel more confident about going home."
"I hope my family can help me out"
"I think I will do alright."
"I will get the test some where else
"I really don't know"
"I have been getting more forgetful"
"I think I ought to go home soon"
"I'm feeling alright
"I would like to go home"
"I am thinking about returning to my home "

## 2019-04-10 NOTE — DISCHARGE NOTE BEHAVIORAL HEALTH - NSBHDCTHERAPYFT_PSY_A_CORE
milieu therapy and supportive group therapy milieu therapy and supportive group Individual, group, medication management, safety planning with 15 min safety checks, discharge planning with family involvement as needed.

## 2019-04-10 NOTE — PROGRESS NOTE BEHAVIORAL HEALTH - SECONDARY DX2
Flatulence
DM (diabetes mellitus)
DM (diabetes mellitus)
HTN (hypertension)
Eosinophilia
DM (diabetes mellitus)
HTN (hypertension)
HTN (hypertension)
DM (diabetes mellitus)
Eosinophilia
Flatulence
HTN (hypertension)
Flatulence
Flatulence
DM (diabetes mellitus)
DM (diabetes mellitus)
HTN (hypertension)

## 2019-04-10 NOTE — DISCHARGE NOTE BEHAVIORAL HEALTH - NSBHDCADDR1FT_A_CORE
Patient's daughter will arrange outpatient treatment in [facility] once they arrive and get settled.

## 2019-04-10 NOTE — PROGRESS NOTE BEHAVIORAL HEALTH - INSIGHT (REGARDING PSYCHIATRIC ILLNESS)
Fair
Poor
Fair
Poor
Fair
Poor
Poor
Fair
Poor
Fair
Poor

## 2019-04-10 NOTE — PROGRESS NOTE BEHAVIORAL HEALTH - JUDGMENT (REGARDING EVERYDAY EVENTS)
Fair
Fair
Poor
Fair
Poor
Fair
Poor
Poor
Fair
Poor
Fair
Poor
Fair

## 2019-04-10 NOTE — PROGRESS NOTE BEHAVIORAL HEALTH - NSBHFUPMEDSE_PSY_A_CORE
None known

## 2019-04-10 NOTE — PROGRESS NOTE BEHAVIORAL HEALTH - IMPULSE CONTROL
Impaired

## 2019-04-10 NOTE — PROGRESS NOTE BEHAVIORAL HEALTH - ESTIMATED DISCHARGE DATE
29-Mar-2019
04-Mar-2019
29-Mar-2019
05-Mar-2019
05-Mar-2019
09-Apr-2019
04-Apr-2019
05-Mar-2019
12-Apr-2019
28-Mar-2019
28-Mar-2019
04-Mar-2019
12-Mar-2019
09-Apr-2019
18-Mar-2019
28-Mar-2019
22-Mar-2019
22-Mar-2019
15-Mar-2019
18-Mar-2019
29-Mar-2019
29-Mar-2019
12-Apr-2019
22-Mar-2019
25-Mar-2019
11-Mar-2019
04-Apr-2019
11-Mar-2019
25-Mar-2019
12-Apr-2019
25-Mar-2019
12-Apr-2019

## 2019-04-10 NOTE — PROGRESS NOTE BEHAVIORAL HEALTH - ORIENTED TO TIME
Yes

## 2019-04-10 NOTE — PROGRESS NOTE BEHAVIORAL HEALTH - OTHER
Limited participation by the patient.

## 2019-04-10 NOTE — PROGRESS NOTE BEHAVIORAL HEALTH - NS ED BHA REVIEW OF ED CHART AVAILABLE INVESTIGATIONS REVIEWED
None available
Yes

## 2019-04-10 NOTE — PROGRESS NOTE BEHAVIORAL HEALTH - NS ED BHA MED ROS PSYCHIATRIC
See HPI

## 2019-04-10 NOTE — PROGRESS NOTE BEHAVIORAL HEALTH - AFFECT QUALITY
Euthymic
Anxious
Anxious
Euthymic
Euthymic
Irritable/Anxious
Anxious
Euthymic
Anxious
Euthymic
Anxious
Anxious
Anxious/Irritable
Irritable/Anxious
Anxious
Anxious
Anxious/Irritable
Anxious/Irritable
Anxious
Anxious
Euthymic
Anxious/Irritable
Irritable/Anxious
Euthymic
Anxious
Irritable/Anxious
Euthymic
Anxious

## 2019-04-10 NOTE — PROGRESS NOTE BEHAVIORAL HEALTH - PERCEPTIONS
Auditory hallucinations
No abnormalities
Auditory hallucinations
No abnormalities
Auditory hallucinations
Auditory hallucinations
No abnormalities
Auditory hallucinations
No abnormalities
Auditory hallucinations
No abnormalities
No abnormalities
Auditory hallucinations
No abnormalities

## 2019-04-10 NOTE — PROGRESS NOTE BEHAVIORAL HEALTH - NS ED BHA AXIS I SECONDARY2 CODE FT
R14.3
E11.9
E11.9
I10
D72.1
E11.9
I10
I10
E11.9
D72.1
E11.9
E11.9
I10
R14.3
E11.9
E11.9
I10

## 2019-04-10 NOTE — DISCHARGE NOTE BEHAVIORAL HEALTH - MEDICATION SUMMARY - MEDICATIONS TO CHANGE
I will SWITCH the dose or number of times a day I take the medications listed below when I get home from the hospital:    amLODIPine

## 2019-04-10 NOTE — PROGRESS NOTE BEHAVIORAL HEALTH - BEHAVIOR
Uncooperative
Uncooperative/Other
Uncooperative/Other
Uncooperative
Uncooperative/Other
Uncooperative
Uncooperative/Other
Uncooperative
Uncooperative
Uncooperative/Other
Other/Uncooperative
Uncooperative
Uncooperative/Other
Uncooperative
Uncooperative
Uncooperative/Other
Uncooperative
Uncooperative/Other
Other/Uncooperative

## 2019-04-10 NOTE — PROGRESS NOTE BEHAVIORAL HEALTH - NSBHLEGALSTATUS_PSY_A_CORE
9.27 (2PC)
9.13 (Voluntary)
9.27 (2PC)
9.39 (Emergency)
9.27 (2PC)
9.13 (Voluntary)
9.27 (2PC)
9.39 (Emergency)
9.13 (Voluntary)
9.27 (2PC)
9.13 (Voluntary)
9.27 (2PC)
9.27 (2PC)
9.39 (Emergency)
9.13 (Voluntary)
9.39 (Emergency)
9.13 (Voluntary)
9.27 (2PC)
9.39 (Emergency)
9.39 (Emergency)
9.27 (2PC)
9.13 (Voluntary)
9.27 (2PC)
9.39 (Emergency)

## 2019-04-10 NOTE — PROGRESS NOTE BEHAVIORAL HEALTH - THOUGHT ASSOCIATIONS
Loose
Normal
Loose
Loose
Normal
Normal
Loose
Loose
Normal
Loose
Normal
Loose
Normal
Loose
Normal
Loose
Normal
Loose

## 2019-04-10 NOTE — PROGRESS NOTE BEHAVIORAL HEALTH - NSBHFUPVIOL_PSY_A_CORE
none known

## 2019-04-10 NOTE — PROGRESS NOTE BEHAVIORAL HEALTH - NS ED BHA MSE SPEECH SPONTANEITY
Normal
Normal
Increased latency
Normal
Normal
Increased latency
Increased latency
Normal
Increased latency
Normal
Normal
Increased latency
Increased latency
Normal
Increased latency
Normal

## 2019-04-10 NOTE — PROGRESS NOTE BEHAVIORAL HEALTH - FUND OF KNOWLEDGE
Normal

## 2019-04-10 NOTE — DISCHARGE NOTE BEHAVIORAL HEALTH - NSBHDCMEDSFT_PSY_A_CORE
MEDICATIONS  (STANDING):  amLODIPine   Tablet 5 milliGRAM(s) Oral daily  memantine 5 milliGRAM(s) Oral at bedtime  risperiDONE   Tablet 0.5 milliGRAM(s) Oral three times a day  simethicone 160 milliGRAM(s) Chew three times a day  traZODone 25 milliGRAM(s) Oral at bedtime  Pt is directed to continue with all medications until directed by his MD to change or discontinued with medications

## 2019-04-10 NOTE — PROGRESS NOTE BEHAVIORAL HEALTH - NSBHADMITIPBHPROVIDER_PSY_A_CORE
N/A
N/A
no
no
N/A
N/A
no
N/A
no
N/A
no
N/A
no
no
N/A
no
N/A
N/A
no
N/A

## 2019-04-10 NOTE — PROGRESS NOTE BEHAVIORAL HEALTH - SECONDARY DX1
DM (diabetes mellitus)
Delusional disorder
Delusional disorder
DM (diabetes mellitus)
Memory deficit
Delusional disorder
HTN (hypertension)
Memory deficit
Delusional disorder
HTN (hypertension)
Mood and affect disturbance
Mood and affect disturbance
Delusional disorder
DM (diabetes mellitus)
DM (diabetes mellitus)
Delusional disorder
Delusional disorder
Memory deficit
DM (diabetes mellitus)
DM (diabetes mellitus)
Delusional disorder
Memory deficit
Delusional disorder
Mood and affect disturbance
Memory deficit

## 2019-04-10 NOTE — PROGRESS NOTE BEHAVIORAL HEALTH - GAIT / STATION
Normal gait / station

## 2019-04-10 NOTE — PROGRESS NOTE BEHAVIORAL HEALTH - NS ED BHA MED ROS CONSTITUTIONAL SYMPTOMS
No complaints
No complaints
Unable to assess
No complaints
Unable to assess
Unable to assess
No complaints
Unable to assess
Unable to assess
No complaints
No complaints
Unable to assess
No complaints
No complaints

## 2019-04-10 NOTE — PROGRESS NOTE BEHAVIORAL HEALTH - NSBHADMITNEWMED_PSY_A_CORE
yes
no
yes
yes
no
yes
no
yes
no
yes
no
yes

## 2019-04-10 NOTE — PROGRESS NOTE BEHAVIORAL HEALTH - RELATEDNESS
Fair
Fair
Poor
Fair
Fair
Poor
Poor
Fair
Poor
Fair
Fair
Poor
Fair
Poor
Fair

## 2019-04-10 NOTE — PROGRESS NOTE BEHAVIORAL HEALTH - LANGUAGE
Impaired naming/Impaired repetition
Impaired repetition/Impaired naming
Impaired naming/Impaired repetition
Impaired repetition/Impaired naming
Impaired repetition/Impaired naming
Impaired naming/Impaired repetition
Impaired repetition/Impaired naming
Impaired repetition/Impaired naming
Impaired naming/Impaired repetition
Impaired repetition/Impaired naming
Impaired naming/Impaired repetition
Impaired repetition/Impaired naming
Impaired naming/Impaired repetition
Impaired naming/Impaired repetition
Impaired repetition/Impaired naming
Impaired naming/Impaired repetition
Impaired repetition/Impaired naming
Impaired repetition/Impaired naming
Impaired naming/Impaired repetition
Impaired naming/Impaired repetition
Impaired repetition/Impaired naming
Impaired naming/Impaired repetition
Impaired naming/Impaired repetition
Impaired repetition/Impaired naming
Impaired naming/Impaired repetition
Impaired naming/Impaired repetition

## 2019-04-10 NOTE — DISCHARGE NOTE BEHAVIORAL HEALTH - NSBHDCSUICPREVNU_PSY_A_CORE
Rendering Text In Billing: The slide was read, and reported in an attached document. [phone]

## 2019-04-10 NOTE — PROGRESS NOTE BEHAVIORAL HEALTH - MOOD
Normal
Anxious
Anxious
Anxious/Normal
Normal
Anxious/Irritable
Anxious
Normal
Anxious
Normal
Anxious
Anxious
Anxious/Irritable
Anxious/Irritable
Anxious
Anxious
Irritable/Anxious
Irritable/Anxious
Anxious
Anxious
Normal/Anxious
Normal
Normal
Normal/Anxious
Normal/Anxious
Normal
Normal
Anxious/Irritable
Irritable/Anxious
Normal
Anxious
Anxious/Irritable
Anxious/Normal
Anxious

## 2019-04-10 NOTE — PROGRESS NOTE BEHAVIORAL HEALTH - NS ED BHA REVIEW OF ED CHART AVAILABLE LABS REVIEWED
None available
None available
Yes
None available
None available
Yes
None available

## 2019-04-10 NOTE — PROGRESS NOTE BEHAVIORAL HEALTH - NSBHCHARTREVIEWVS_PSY_A_CORE FT
Vital Signs Last 24 Hrs  T(C): 36.9 (10 Apr 2019 07:45), Max: 36.9 (10 Apr 2019 07:45)  T(F): 98.5 (10 Apr 2019 07:45), Max: 98.5 (10 Apr 2019 07:45)  HR: --  BP: --  BP(mean): --  RR: 14 (10 Apr 2019 07:45) (14 - 14)  SpO2: 99% (10 Apr 2019 07:45) (99% - 99%)

## 2019-04-10 NOTE — DISCHARGE NOTE BEHAVIORAL HEALTH - NSBHDCRESPONSEFT_PSY_A_CORE
MEDICATIONS  (STANDING):  amLODIPine   Tablet 5 milliGRAM(s) Oral daily  memantine 5 milliGRAM(s) Oral at bedtime  risperiDONE   Tablet 0.5 milliGRAM(s) Oral three times a day  simethicone 160 milliGRAM(s) Chew three times a day  traZODone 25 milliGRAM(s) Oral at bedtime Good response to treatment

## 2019-04-10 NOTE — DISCHARGE NOTE BEHAVIORAL HEALTH - LAUNCH MEDICATION RECONCILIATION
LOV 03/21/2018-    PATIENT REQUESTING REFILL ON PLAVIX 75MG-1 TAB DAILY. MEDICATION WILL BE APPROVED #30 W/1 REFILL. NO FURTHER REFILLS- NEEDS APPT.    <<-----Click here for Discharge Medication Review

## 2019-04-10 NOTE — PROGRESS NOTE BEHAVIORAL HEALTH - NS ED BHA MSE SPEECH ARTICULATION
Normal
Normal
Impaired
Normal
Normal
Impaired
Normal
Impaired
Normal
Normal
Impaired
Impaired
Normal
Impaired
Normal
Normal
Impaired
Normal

## 2019-04-10 NOTE — PROGRESS NOTE BEHAVIORAL HEALTH - HYGIENE
Fair
Poor
Fair
Poor
Fair
Poor
Fair
Poor
Fair

## 2019-04-10 NOTE — PROGRESS NOTE BEHAVIORAL HEALTH - PROBLEM SELECTOR PROBLEM 2
Memory deficit
Delusional disorder
Mood and affect disturbance
Memory deficit
Mood and affect disturbance
Mood and affect disturbance
DM (diabetes mellitus)
Memory deficit
DM (diabetes mellitus)
Delusional disorder
DM (diabetes mellitus)
DM (diabetes mellitus)
Memory deficit
Mood and affect disturbance
DM (diabetes mellitus)
Memory deficit
DM (diabetes mellitus)
Delusional disorder
Memory deficit
Memory deficit

## 2019-04-10 NOTE — PROGRESS NOTE BEHAVIORAL HEALTH - AXIS III
HTN, DM

## 2019-04-10 NOTE — PROGRESS NOTE BEHAVIORAL HEALTH - PROBLEM SELECTOR PLAN 1
decreased the risperdal 0.5mg to tid
risperdal  encourage to attend groups
risperdal  to decrease delusions and hallucinations
risperdal 0.5mg po tid
continue risperdal
continue with risperdal 0.5mg po tid
risperdal
risperdal  to decrease delusions and hallucinations
risperdal  to decrease delusions and hallucinations
risperdal
risperdal for the psychosis
risperiDONE   Tablet 0.5 milliGRAM(s) Oral four times a day
increased Risperdal 0.5mg po tid
risperdal
risperiDONE   Tablet 0.5 milliGRAM(s) Oral four times a day
Plan: 1. Risperdal
continue with risperdal 0.5mg po tid
continue with risperdal 0.5mg po tid
risperdal
risperdal  0.5mg po bid
risperdal 0.5mg po QiD
risperdal 0.5mg po tid
1. Risperdal 0.5 mg po TID for psychotic symptoms
Risperdal
risperdal 0.5mg po QID
risperdal
risperdal  encourage to attend groups
continue with risperdal 0.5mg po tid
risperdal  0.5mg po tid

## 2019-04-10 NOTE — PROGRESS NOTE BEHAVIORAL HEALTH - NSBHCONSORIP_PSY_A_CORE
Inpatient Admission...

## 2019-04-10 NOTE — PROGRESS NOTE BEHAVIORAL HEALTH - SUMMARY
Patient is a 63 y/o AA M, , has 3 adult children, unemployed, domiciled alone for one week (previously living with wife), with unknown PPhx, denies hx of inpt hospitalizations, denies suicide attempts, denies violence hx, denies legal problems, denies substance use, and PMhx of HTN and DM. Patient presents today brought in by EMS after he was found on the streets reporting that a cult is after him. On evaluation pt reports complex paranoid delusions and associated AH that he is being targeted by a cult/gang and the  of [facility], and that he is unsafe at home and has to give up his house. He is unable to engage in safety planning or care for himself due to his psychiatric symptoms. At this time pt requires inpt hospitalization for safety and stabilization.

## 2019-04-10 NOTE — PROGRESS NOTE BEHAVIORAL HEALTH - NSBHADMITIPOBS_PSY_A_CORE
Routine observation

## 2019-04-10 NOTE — PROGRESS NOTE BEHAVIORAL HEALTH - NSBHCHARTREVIEWINVESTIGATE_PSY_A_CORE FT
QRS axis to [] ° and NSR at a rate of [] BPM. There was no atrial enlargement. There was no ventricular hypertrophy. There were no ST-T changes and all intervals were normal.
QRS axis to [] ° and NSR at a rate of [] BPM. There was no atrial enlargement. There was no ventricular hypertrophy. There were no ST-T changes and all intervals were normal.    MEDICATIONS  (STANDING):  amLODIPine   Tablet 5 milliGRAM(s) Oral daily  dextrose 5%. 1000 milliLiter(s) (50 mL/Hr) IV Continuous <Continuous>  dextrose 50% Injectable 25 Gram(s) IV Push once  dextrose 50% Injectable 12.5 Gram(s) IV Push once  dextrose 50% Injectable 25 Gram(s) IV Push once  insulin lispro (HumaLOG) corrective regimen sliding scale   SubCutaneous three times a day before meals  metFORMIN 500 milliGRAM(s) Oral two times a day  risperiDONE   Tablet 0.5 milliGRAM(s) Oral three times a day  simethicone 80 milliGRAM(s) Chew three times a day  traZODone 25 milliGRAM(s) Oral at bedtime
QRS axis to [] ° and NSR at a rate of [] BPM. There was no atrial enlargement. There was no ventricular hypertrophy. There were no ST-T changes and all intervals were normal.

## 2019-04-10 NOTE — PROGRESS NOTE BEHAVIORAL HEALTH - NSBHLOC_PSY_A_CORE
Alert

## 2019-04-10 NOTE — PROGRESS NOTE BEHAVIORAL HEALTH - ABNORMAL MOVEMENTS
No abnormal movements

## 2019-04-10 NOTE — PROGRESS NOTE BEHAVIORAL HEALTH - NS ED BHA MSE SPEECH RATE
Slowed

## 2019-04-10 NOTE — DISCHARGE NOTE BEHAVIORAL HEALTH - REASON FOR ADMISSION
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

## 2019-04-10 NOTE — PROGRESS NOTE BEHAVIORAL HEALTH - NS ED BHA MSE SPEECH VOLUME
Normal
Normal
Soft
Normal
Normal
Soft
Soft
Normal
Soft
Normal
Normal
Soft
Soft
Normal
Soft
Normal

## 2019-04-10 NOTE — PROGRESS NOTE BEHAVIORAL HEALTH - NS ED BHA MSE GENERAL APPEARANCE
Well developed

## 2019-04-10 NOTE — DISCHARGE NOTE BEHAVIORAL HEALTH - NSBHDCTESTSFT_PSY_A_CORE
Prostate Ca Screen, PSA Total: 8.96: Method: Roche Electrochemiluminescence Immuno Assay  Values obtained with different assay methods or kits cannot be  used interchangeably.  Results cannot be interpreted as absolute evidence of the presence  or absence of malignant disease. ng/mL (04.04.19 @ 07:49)  Complete Blood Count + Automated Diff in AM (04.04.19 @ 07:49)    WBC Count: 9.58 K/uL    RBC Count: 4.85 M/uL    Hemoglobin: 13.2 g/dL    Hematocrit: 41.8 %    Mean Cell Volume: 86.2 fl    Mean Cell Hemoglobin: 27.2 pg    Mean Cell Hemoglobin Conc: 31.6 gm/dL    Red Cell Distrib Width: 13.6 %    Platelet Count - Automated: 139 K/uL    Auto Neutrophil #: 5.97 K/uL    Auto Lymphocyte #: 2.03 K/uL    Auto Monocyte #: 0.86 K/uL    Auto Eosinophil #: 0.64 K/uL    Auto Basophil #: 0.02 K/uL    Auto Neutrophil %: 62.3: Differential percentages must be correlated with absolute numbers for  clinical significance. %    Auto Lymphocyte %: 21.2 %    Auto Monocyte %: 9.0 %    Auto Eosinophil %: 6.7 %    Auto Basophil %: 0.2 %    Auto Immature Granulocyte %: 0.6 %    Nucleated RBC: 0 /100 WBCs Prostate Ca Screen, PSA Total: 8.96: Method: Roche Electrochemiluminescence Immuno Assay  Values obtained with different assay methods or kits cannot be  used interchangeably.  Results cannot be interpreted as absolute evidence of the presence  or absence of malignant disease. ng/mL (04.04.19 @ 07:49)  Complete Blood Count + Automated Diff in AM (04.04.19 @ 07:49)    WBC Count: 9.58 K/uL    RBC Count: 4.85 M/uL    Hemoglobin: 13.2 g/dL    Hematocrit: 41.8 %    Mean Cell Volume: 86.2 fl    Mean Cell Hemoglobin: 27.2 pg    Mean Cell Hemoglobin Conc: 31.6 gm/dL    Red Cell Distrib Width: 13.6 %    Platelet Count - Automated: 139 K/uL    Auto Neutrophil #: 5.97 K/uL    Auto Lymphocyte #: 2.03 K/uL    Auto Monocyte #: 0.86 K/uL    Auto Eosinophil #: 0.64 K/uL    Auto Basophil #: 0.02 K/uL    Auto Neutrophil %: 62.3: Differential percentages must be correlated with absolute numbers for  clinical significance. %    Auto Lymphocyte %: 21.2 %    Auto Monocyte %: 9.0 %    Auto Eosinophil %: 6.7 %    Auto Basophil %: 0.2 %    Auto Immature Granulocyte %: 0.6 %    Nucleated RBC: 0 /100 WBCs  Thyroid Stimulating Hormone, Serum: 1.49 uU/mL (03.08.19 @ 08:15)  Hemoglobin A1C, Whole Blood: 7.9  Lipid Profile in AM (03.08.19 @ 08:15)    Total Cholesterol/HDL Ratio Measurement: 2.4 RATIO    Cholesterol, Serum: 212 mg/dL    Triglycerides, Serum: 36 mg/dL    HDL Cholesterol, Serum: 90: HDL Levels >/= 60 mg/dL are considered beneficial and a "negative" risk  factor.  Effective 08/15/2018: New reference range and interpretive comment. mg/dL    Direct LDL: 115:

## 2019-04-10 NOTE — PROGRESS NOTE BEHAVIORAL HEALTH - AFFECT RANGE
Blunted/Constricted
Constricted
Constricted
Blunted/Constricted
Blunted/Constricted
Constricted
Constricted
Blunted/Constricted
Blunted/Constricted
Constricted
Blunted/Constricted
Constricted
Constricted
Blunted/Constricted
Constricted/Blunted
Blunted/Constricted
Constricted/Blunted
Constricted
Constricted
Constricted/Blunted
Constricted
Constricted
Blunted/Constricted
Constricted

## 2019-04-10 NOTE — PROGRESS NOTE BEHAVIORAL HEALTH - THOUGHT PROCESS
Tangential/Perseverative/Disorganized
Linear
Tangential
Linear
Perseverative/Disorganized/Tangential
Tangential/Disorganized/Perseverative
Tangential/Circumstantial
Circumstantial/Tangential
Circumstantial/Tangential
Tangential
Linear
Tangential/Circumstantial
Linear
Linear
Tangential/Disorganized/Perseverative
Linear
Linear

## 2019-04-10 NOTE — PROGRESS NOTE BEHAVIORAL HEALTH - AFFECT CONGRUENCE
Congruent/Other
Other/Congruent
Congruent/Other
Not congruent
Not congruent/Other
Other/Congruent
Congruent/Other
Not congruent
Not congruent
Other/Not congruent
Not congruent
Not congruent
Other/Not congruent
Not congruent/Other
Not congruent/Other
Other/Congruent
Congruent/Other
Other/Congruent
Not congruent
Not congruent
Congruent/Other
Not congruent
Other/Not congruent
Congruent/Other
Not congruent/Other

## 2019-04-10 NOTE — DISCHARGE NOTE BEHAVIORAL HEALTH - NSBHDCLABSFT_PSY_A_CORE
pt needing to go for open MRI as he was refusing to use standard MRI for pelvic and head MRI for the PSA of Prostate Ca Screen, PSA Total: 8.96: Method: Roche Electrochemiluminescence Immuno Assay  Values obtained with different assay methods or kits cannot be  used interchangeably.  Results cannot be interpreted as absolute evidence of the presence  or absence of malignant disease. ng/mL (04.04.19 @ 07:49)

## 2019-04-10 NOTE — PROGRESS NOTE BEHAVIORAL HEALTH - NS ED BHA MED ROS GASTROINTESTINAL
No complaints

## 2019-04-10 NOTE — PROGRESS NOTE BEHAVIORAL HEALTH - EYE CONTACT
Fair
Fair
Poor
Fair
Poor
Fair
Poor
Fair
Poor
Fair

## 2019-04-10 NOTE — DISCHARGE NOTE BEHAVIORAL HEALTH - NSBHDCCRISISPLAN1FT_PSY_A_CORE
Advised to return to hospital or go to nearest ED or call 911 or [phone] or [phone] hotlines for any severe, worsening or persistent symptoms including suicidal/homicidal ideations, intent or plans. Patient verbalized understanding of instructions.

## 2019-04-10 NOTE — PROGRESS NOTE BEHAVIORAL HEALTH - NS ED BHA AXIS I SECONDARY1 CODE FT
E11.9
F22
F22
E11.9
R41.3
F22
I10
R41.3
F22
I10
R45.86
R45.86
F22
E11.9
E11.9
F22
F22
R41.3
E11.9
E11.9
F22
R41.3
F22
R45.86
R41.3

## 2019-04-10 NOTE — PROGRESS NOTE BEHAVIORAL HEALTH - NSBHADMITMEDEDU_PSY_A_CORE
yes...
no
no
yes...
no
yes...
no
yes...

## 2019-04-10 NOTE — PROGRESS NOTE BEHAVIORAL HEALTH - NSBHADMITIPREASON_PSY_A_CORE
Danger to self; mental illness expected to respond to inpatient care
unable to care for self
Danger to self; mental illness expected to respond to inpatient care
Danger to self; mental illness expected to respond to inpatient care
other reason
other reason
Danger to self; mental illness expected to respond to inpatient care
other reason
Danger to self; mental illness expected to respond to inpatient care
other reason
pt is unable to care for self.
Danger to self; mental illness expected to respond to inpatient care
Danger to self; mental illness expected to respond to inpatient care
other reason
Danger to self; mental illness expected to respond to inpatient care
other reason
Danger to self; mental illness expected to respond to inpatient care
other reason
unable to care for self
Danger to self; mental illness expected to respond to inpatient care
Danger to self; mental illness expected to respond to inpatient care
other reason
unable to care for self
Danger to self; mental illness expected to respond to inpatient care
other reason
Danger to self; mental illness expected to respond to inpatient care
other reason

## 2019-04-10 NOTE — PROGRESS NOTE BEHAVIORAL HEALTH - NSBHADMITIPREASONDETAILS_PSY_A_CORE FT
pt unable to care for himself
pt denies any suicidal ideation or plan
unable to care for self
pt denies any suicidals ideation or plan
pt unable to care for self
pt unable to care for himself
pt unable to care for self
unable to care for self
pt was unable to care for oneself
unable to care for self
pt unable to care for self due to paranoid delusions
prt was responding to internal stimuli

## 2019-04-10 NOTE — PROGRESS NOTE BEHAVIORAL HEALTH - PROBLEM SELECTOR PROBLEM 1
Schizoaffective disorder
Psychosis
Psychosis
Schizoaffective disorder
Delusional disorder
Schizoaffective disorder
Delusional disorder
Psychosis

## 2019-04-10 NOTE — PROGRESS NOTE BEHAVIORAL HEALTH - NSBHADMITIPOBSFT_PSY_A_CORE
pt denies any suicidal ideation or plan
pt denies any suicidal ideation
pt denies any suicidal ideation or plan
pt with denial of any suicidal ideation or plan
pt denies any suicidal ideation or plan
pt denies any suicidal ideation or plan
pt with denial of any suicidal ideation
pt denies any suicidal ideation
pt denies any suicidal ideation or plan
pt denies any suicidal ideation
pt denies any suicidal ideation
pt denies any suicidal ideation or plan
pt denies any suicidal ideation
pt denies any suicidal ideation or plan
pt denies any suicidal ideation
pt denies any suicidal ideation or plan
pt denies any suicidal ideation or plan
pt denies any suicidal ideation plan
pt denies any suicidal ideation or plan
pt upon hospitalization pt with denial of any suicidal ideation or plan

## 2019-04-10 NOTE — PROGRESS NOTE BEHAVIORAL HEALTH - NSBHFUPTYPE_PSY_A_CORE
Inpatient
Inpatient-On Service Note
Inpatient

## 2019-04-11 LAB — GLUCOSE BLDC GLUCOMTR-MCNC: 180 MG/DL — HIGH (ref 70–99)

## 2019-04-11 RX ADMIN — SIMETHICONE 160 MILLIGRAM(S): 80 TABLET, CHEWABLE ORAL at 20:14

## 2019-04-11 RX ADMIN — AMLODIPINE BESYLATE 5 MILLIGRAM(S): 2.5 TABLET ORAL at 08:55

## 2019-04-11 RX ADMIN — RISPERIDONE 0.5 MILLIGRAM(S): 4 TABLET ORAL at 13:52

## 2019-04-11 RX ADMIN — RISPERIDONE 0.5 MILLIGRAM(S): 4 TABLET ORAL at 08:55

## 2019-04-11 RX ADMIN — SIMETHICONE 160 MILLIGRAM(S): 80 TABLET, CHEWABLE ORAL at 08:56

## 2019-04-11 RX ADMIN — RISPERIDONE 0.5 MILLIGRAM(S): 4 TABLET ORAL at 20:15

## 2019-04-11 RX ADMIN — MEMANTINE HYDROCHLORIDE 5 MILLIGRAM(S): 10 TABLET ORAL at 20:15

## 2019-04-11 RX ADMIN — SIMETHICONE 160 MILLIGRAM(S): 80 TABLET, CHEWABLE ORAL at 13:52

## 2019-04-11 RX ADMIN — Medication 25 MILLIGRAM(S): at 20:15

## 2019-04-12 VITALS — RESPIRATION RATE: 14 BRPM | OXYGEN SATURATION: 98 % | TEMPERATURE: 99 F

## 2019-04-12 LAB — GLUCOSE BLDC GLUCOMTR-MCNC: 200 MG/DL — HIGH (ref 70–99)

## 2019-04-12 RX ORDER — METFORMIN HYDROCHLORIDE 850 MG/1
1 TABLET ORAL
Qty: 28 | Refills: 0 | OUTPATIENT
Start: 2019-04-12 | End: 2019-04-25

## 2019-04-12 RX ORDER — METFORMIN HYDROCHLORIDE 850 MG/1
500 TABLET ORAL
Qty: 0 | Refills: 0 | Status: DISCONTINUED | OUTPATIENT
Start: 2019-04-12 | End: 2019-04-12

## 2019-04-12 RX ADMIN — AMLODIPINE BESYLATE 5 MILLIGRAM(S): 2.5 TABLET ORAL at 09:04

## 2019-04-12 RX ADMIN — RISPERIDONE 0.5 MILLIGRAM(S): 4 TABLET ORAL at 09:04

## 2019-04-12 RX ADMIN — SIMETHICONE 160 MILLIGRAM(S): 80 TABLET, CHEWABLE ORAL at 09:04

## 2019-04-12 RX ADMIN — METFORMIN HYDROCHLORIDE 500 MILLIGRAM(S): 850 TABLET ORAL at 11:09

## 2019-04-12 RX ADMIN — SIMETHICONE 160 MILLIGRAM(S): 80 TABLET, CHEWABLE ORAL at 13:53

## 2019-04-12 RX ADMIN — RISPERIDONE 0.5 MILLIGRAM(S): 4 TABLET ORAL at 13:53

## 2019-04-13 NOTE — CHART NOTE - NSCHARTNOTEFT_GEN_A_CORE
Patient left yesterday with his daughter.  No reachable phone number or address.  Family will contact [facility] for follow up care if patient remains in this country.

## 2019-04-23 DIAGNOSIS — G47.00 INSOMNIA, UNSPECIFIED: ICD-10-CM

## 2019-04-23 DIAGNOSIS — R41.3 OTHER AMNESIA: ICD-10-CM

## 2019-04-23 DIAGNOSIS — Z79.84 LONG TERM (CURRENT) USE OF ORAL HYPOGLYCEMIC DRUGS: ICD-10-CM

## 2019-04-23 DIAGNOSIS — F25.9 SCHIZOAFFECTIVE DISORDER, UNSPECIFIED: ICD-10-CM

## 2019-04-23 DIAGNOSIS — F22 DELUSIONAL DISORDERS: ICD-10-CM

## 2019-04-23 DIAGNOSIS — D72.1 EOSINOPHILIA: ICD-10-CM

## 2019-04-23 DIAGNOSIS — I10 ESSENTIAL (PRIMARY) HYPERTENSION: ICD-10-CM

## 2019-04-23 DIAGNOSIS — E11.9 TYPE 2 DIABETES MELLITUS WITHOUT COMPLICATIONS: ICD-10-CM

## 2019-04-23 DIAGNOSIS — D69.6 THROMBOCYTOPENIA, UNSPECIFIED: ICD-10-CM

## 2019-04-23 DIAGNOSIS — R14.3 FLATULENCE: ICD-10-CM

## 2019-06-05 NOTE — PROGRESS NOTE BEHAVIORAL HEALTH - NS ED BHA MED ROS GENITOURINARY
No complaints
EFRAIN [place]
No complaints

## 2021-10-02 NOTE — ED BEHAVIORAL HEALTH ASSESSMENT NOTE - SUICIDE PROTECTIVE FACTORS
Patient/Caregiver provided printed discharge information. Identifies reasons for living/Future oriented/Responsibility to family and others

## 2022-04-15 NOTE — ED PROVIDER NOTE - CPE EDP ENMT NORM
normal... Infliximab Counseling:  I discussed with the patient the risks of infliximab including but not limited to myelosuppression, immunosuppression, autoimmune hepatitis, demyelinating diseases, lymphoma, and serious infections.  The patient understands that monitoring is required including a PPD at baseline and must alert us or the primary physician if symptoms of infection or other concerning signs are noted.

## 2023-08-17 NOTE — PROGRESS NOTE BEHAVIORAL HEALTH - NSBHCHARTREVIEWVS_PSY_A_CORE FT
There are no preventive care reminders to display for this patient.    Patient is up to date, no discussion needed.   Vital Signs Last 24 Hrs  T(C): 36.7 (29 Mar 2019 07:42), Max: 36.7 (29 Mar 2019 07:42)  T(F): 98.1 (29 Mar 2019 07:42), Max: 98.1 (29 Mar 2019 07:42)  HR: --  BP: --151/71  BP(mean): --  RR: 14 (29 Mar 2019 07:42) (14 - 14)  SpO2: 98% (29 Mar 2019 07:42) (98% - 98%)